# Patient Record
Sex: FEMALE | Race: WHITE | NOT HISPANIC OR LATINO | URBAN - METROPOLITAN AREA
[De-identification: names, ages, dates, MRNs, and addresses within clinical notes are randomized per-mention and may not be internally consistent; named-entity substitution may affect disease eponyms.]

---

## 2018-01-02 ENCOUNTER — HOSPITAL ENCOUNTER (INPATIENT)
Facility: HOSPITAL | Age: 73
LOS: 3 days | Discharge: HOME | DRG: 192 | End: 2018-01-05
Attending: EMERGENCY MEDICINE | Admitting: EMERGENCY MEDICINE
Payer: MEDICARE

## 2018-01-02 DIAGNOSIS — J44.1 COPD WITH ACUTE EXACERBATION: ICD-10-CM

## 2018-01-02 DIAGNOSIS — R06.00 DYSPNEA, UNSPECIFIED TYPE: Primary | ICD-10-CM

## 2018-01-02 DIAGNOSIS — R07.9 CHEST PAIN: ICD-10-CM

## 2018-01-02 DIAGNOSIS — R79.89 ELEVATED TROPONIN: ICD-10-CM

## 2018-01-02 DIAGNOSIS — I35.9 NONRHEUMATIC AORTIC VALVE DISORDER: ICD-10-CM

## 2018-01-02 LAB
ALBUMIN SERPL BCP-MCNC: 3.6 G/DL
ALP SERPL-CCNC: 95 U/L
ALT SERPL W/O P-5'-P-CCNC: 28 U/L
ANION GAP SERPL CALC-SCNC: 15 MMOL/L
AST SERPL-CCNC: 24 U/L
BASOPHILS # BLD AUTO: 0.02 K/UL
BASOPHILS NFR BLD: 0.2 %
BILIRUB SERPL-MCNC: 0.9 MG/DL
BNP SERPL-MCNC: 144 PG/ML
BUN SERPL-MCNC: 19 MG/DL
CALCIUM SERPL-MCNC: 10.2 MG/DL
CHLORIDE SERPL-SCNC: 90 MMOL/L
CO2 SERPL-SCNC: 32 MMOL/L
CREAT SERPL-MCNC: 0.8 MG/DL
DIFFERENTIAL METHOD: ABNORMAL
EOSINOPHIL # BLD AUTO: 0 K/UL
EOSINOPHIL NFR BLD: 0 %
ERYTHROCYTE [DISTWIDTH] IN BLOOD BY AUTOMATED COUNT: 12.8 %
EST. GFR  (AFRICAN AMERICAN): >60 ML/MIN/1.73 M^2
EST. GFR  (NON AFRICAN AMERICAN): >60 ML/MIN/1.73 M^2
GLUCOSE SERPL-MCNC: 151 MG/DL
HCT VFR BLD AUTO: 48.1 %
HGB BLD-MCNC: 17.5 G/DL
INR PPP: 1
LYMPHOCYTES # BLD AUTO: 0.7 K/UL
LYMPHOCYTES NFR BLD: 6.3 %
MCH RBC QN AUTO: 31.8 PG
MCHC RBC AUTO-ENTMCNC: 36.4 G/DL
MCV RBC AUTO: 88 FL
MONOCYTES # BLD AUTO: 1.4 K/UL
MONOCYTES NFR BLD: 12.7 %
NEUTROPHILS # BLD AUTO: 9.1 K/UL
NEUTROPHILS NFR BLD: 80.8 %
PLATELET # BLD AUTO: 221 K/UL
PMV BLD AUTO: 9.9 FL
POTASSIUM SERPL-SCNC: 3.5 MMOL/L
PROT SERPL-MCNC: 8.2 G/DL
PROTHROMBIN TIME: 10.1 SEC
RBC # BLD AUTO: 5.5 M/UL
SODIUM SERPL-SCNC: 137 MMOL/L
TROPONIN I SERPL DL<=0.01 NG/ML-MCNC: 0.07 NG/ML
WBC # BLD AUTO: 11.27 K/UL

## 2018-01-02 PROCEDURE — 94640 AIRWAY INHALATION TREATMENT: CPT

## 2018-01-02 PROCEDURE — 85610 PROTHROMBIN TIME: CPT

## 2018-01-02 PROCEDURE — 93010 ELECTROCARDIOGRAM REPORT: CPT | Mod: ,,, | Performed by: INTERNAL MEDICINE

## 2018-01-02 PROCEDURE — 83880 ASSAY OF NATRIURETIC PEPTIDE: CPT

## 2018-01-02 PROCEDURE — 84484 ASSAY OF TROPONIN QUANT: CPT | Mod: 91

## 2018-01-02 PROCEDURE — 93010 ELECTROCARDIOGRAM REPORT: CPT | Mod: 76,,, | Performed by: INTERNAL MEDICINE

## 2018-01-02 PROCEDURE — 94761 N-INVAS EAR/PLS OXIMETRY MLT: CPT

## 2018-01-02 PROCEDURE — 36415 COLL VENOUS BLD VENIPUNCTURE: CPT

## 2018-01-02 PROCEDURE — 63600175 PHARM REV CODE 636 W HCPCS: Performed by: EMERGENCY MEDICINE

## 2018-01-02 PROCEDURE — 93005 ELECTROCARDIOGRAM TRACING: CPT

## 2018-01-02 PROCEDURE — 84484 ASSAY OF TROPONIN QUANT: CPT

## 2018-01-02 PROCEDURE — 99900035 HC TECH TIME PER 15 MIN (STAT)

## 2018-01-02 PROCEDURE — 99285 EMERGENCY DEPT VISIT HI MDM: CPT | Mod: 25

## 2018-01-02 PROCEDURE — 25000003 PHARM REV CODE 250: Performed by: EMERGENCY MEDICINE

## 2018-01-02 PROCEDURE — 21400001 HC TELEMETRY ROOM

## 2018-01-02 PROCEDURE — 27100107 HC POCKET PEAK FLOW METER

## 2018-01-02 PROCEDURE — 85025 COMPLETE CBC W/AUTO DIFF WBC: CPT

## 2018-01-02 PROCEDURE — 27000221 HC OXYGEN, UP TO 24 HOURS

## 2018-01-02 PROCEDURE — 96375 TX/PRO/DX INJ NEW DRUG ADDON: CPT

## 2018-01-02 PROCEDURE — 25000242 PHARM REV CODE 250 ALT 637 W/ HCPCS: Performed by: EMERGENCY MEDICINE

## 2018-01-02 PROCEDURE — 96365 THER/PROPH/DIAG IV INF INIT: CPT

## 2018-01-02 PROCEDURE — 80053 COMPREHEN METABOLIC PANEL: CPT

## 2018-01-02 PROCEDURE — 87040 BLOOD CULTURE FOR BACTERIA: CPT

## 2018-01-02 RX ORDER — SODIUM CHLORIDE 9 MG/ML
INJECTION, SOLUTION INTRAVENOUS CONTINUOUS
Status: ACTIVE | OUTPATIENT
Start: 2018-01-02 | End: 2018-01-03

## 2018-01-02 RX ORDER — IPRATROPIUM BROMIDE AND ALBUTEROL SULFATE 2.5; .5 MG/3ML; MG/3ML
3 SOLUTION RESPIRATORY (INHALATION) EVERY 4 HOURS
Status: DISPENSED | OUTPATIENT
Start: 2018-01-02 | End: 2018-01-03

## 2018-01-02 RX ORDER — ONDANSETRON 2 MG/ML
4 INJECTION INTRAMUSCULAR; INTRAVENOUS EVERY 8 HOURS PRN
Status: DISCONTINUED | OUTPATIENT
Start: 2018-01-02 | End: 2018-01-03

## 2018-01-02 RX ORDER — IPRATROPIUM BROMIDE 0.5 MG/2.5ML
0.5 SOLUTION RESPIRATORY (INHALATION)
Status: COMPLETED | OUTPATIENT
Start: 2018-01-02 | End: 2018-01-02

## 2018-01-02 RX ORDER — MORPHINE SULFATE 10 MG/ML
2 INJECTION INTRAMUSCULAR; INTRAVENOUS; SUBCUTANEOUS EVERY 4 HOURS PRN
Status: DISCONTINUED | OUTPATIENT
Start: 2018-01-02 | End: 2018-01-03

## 2018-01-02 RX ORDER — SODIUM CHLORIDE 0.9 % (FLUSH) 0.9 %
3 SYRINGE (ML) INJECTION EVERY 8 HOURS PRN
Status: DISCONTINUED | OUTPATIENT
Start: 2018-01-02 | End: 2018-01-03

## 2018-01-02 RX ORDER — METHYLPREDNISOLONE SOD SUCC 125 MG
80 VIAL (EA) INJECTION
Status: COMPLETED | OUTPATIENT
Start: 2018-01-02 | End: 2018-01-02

## 2018-01-02 RX ORDER — METHYLPREDNISOLONE SOD SUCC 125 MG
80 VIAL (EA) INJECTION EVERY 8 HOURS
Status: DISCONTINUED | OUTPATIENT
Start: 2018-01-02 | End: 2018-01-03

## 2018-01-02 RX ORDER — IBUPROFEN 200 MG
200 TABLET ORAL EVERY 6 HOURS PRN
COMMUNITY

## 2018-01-02 RX ORDER — ASPIRIN 325 MG
325 TABLET ORAL DAILY
Status: DISCONTINUED | OUTPATIENT
Start: 2018-01-03 | End: 2018-01-05 | Stop reason: HOSPADM

## 2018-01-02 RX ORDER — LEVALBUTEROL 1.25 MG/.5ML
1.25 SOLUTION, CONCENTRATE RESPIRATORY (INHALATION)
Status: COMPLETED | OUTPATIENT
Start: 2018-01-02 | End: 2018-01-02

## 2018-01-02 RX ORDER — NAPROXEN SODIUM 220 MG/1
324 TABLET, FILM COATED ORAL
Status: COMPLETED | OUTPATIENT
Start: 2018-01-02 | End: 2018-01-02

## 2018-01-02 RX ADMIN — METHYLPREDNISOLONE SODIUM SUCCINATE 80 MG: 125 INJECTION, POWDER, FOR SOLUTION INTRAMUSCULAR; INTRAVENOUS at 04:01

## 2018-01-02 RX ADMIN — AZITHROMYCIN MONOHYDRATE 500 MG: 500 INJECTION, POWDER, LYOPHILIZED, FOR SOLUTION INTRAVENOUS at 08:01

## 2018-01-02 RX ADMIN — METHYLPREDNISOLONE SODIUM SUCCINATE 80 MG: 125 INJECTION, POWDER, FOR SOLUTION INTRAMUSCULAR; INTRAVENOUS at 09:01

## 2018-01-02 RX ADMIN — ASPIRIN 81 MG 324 MG: 81 TABLET ORAL at 04:01

## 2018-01-02 RX ADMIN — LEVALBUTEROL 1.25 MG: 1.25 SOLUTION, CONCENTRATE RESPIRATORY (INHALATION) at 04:01

## 2018-01-02 RX ADMIN — SODIUM CHLORIDE: 0.9 INJECTION, SOLUTION INTRAVENOUS at 08:01

## 2018-01-02 RX ADMIN — IPRATROPIUM BROMIDE 0.5 MG: 0.5 SOLUTION RESPIRATORY (INHALATION) at 04:01

## 2018-01-02 RX ADMIN — CEFTRIAXONE 2 G: 2 INJECTION, SOLUTION INTRAVENOUS at 06:01

## 2018-01-02 NOTE — ED PROVIDER NOTES
"Encounter Date: 1/2/2018    SCRIBE #1 NOTE: I, Shondaruaga Jenn, am scribing for, and in the presence of,  Toby Griffin MD. I have scribed the following portions of the note - Other sections scribed: HPI and ROS.       History     Chief Complaint   Patient presents with    Cough     cough x 6 days; sob x 4 days; sent from urgent care for "low oxygen level and acute bronchitis"    Shortness of Breath     Chief Complaint: Cough; SOB    HPI: This 72 y.o. Female with IBS presents to the ED c/o secondary to a cough and SOB. Cough is productive with green/yellow phlegm. There's associated chills and an appetite decrease. Patient states "I just wasn't feeling right". Symptoms are severe and have progressively worsened since onset. There's no attempted treatment. She denies fever, headaches, chest pain, weakness, nausea, vomiting, or abdominal pain.      The history is provided by the patient. No  was used.     Review of patient's allergies indicates:  No Known Allergies  Past Medical History:   Diagnosis Date    IBS (irritable bowel syndrome)      History reviewed. No pertinent surgical history.  History reviewed. No pertinent family history.  Social History   Substance Use Topics    Smoking status: Former Smoker     Types: Cigarettes     Quit date: 12/28/2017    Smokeless tobacco: Not on file      Comment: Pt states "i quit smoking 5 days ago"    Alcohol use Not on file     Review of Systems   Constitutional: Positive for appetite change and chills. Negative for fever.   HENT: Negative for ear pain and sore throat.    Eyes: Negative for pain.   Respiratory: Positive for cough and shortness of breath.    Cardiovascular: Negative for chest pain.   Gastrointestinal: Negative for abdominal pain, diarrhea, nausea and vomiting.   Genitourinary: Negative for dysuria and hematuria.   Musculoskeletal: Negative for myalgias (arm or leg pain).   Skin: Negative for rash.   Neurological: Negative for " weakness and headaches.       Physical Exam     Initial Vitals [01/02/18 1210]   BP Pulse Resp Temp SpO2   (!) 149/78 (!) 115 20 98 °F (36.7 °C) (!) 88 %      MAP       101.67         Physical Exam  Nursing note and vitals reviewed.  Constitutional:  Uncomfortable appearing elderly female with increased work of breathing.  HENT:    Head: NC/AT    Eyes: Conjunctivae normal.   (-) scleral icterus.              Mouth/Throat: Dry mucosal membranes..     Neck: Neck supple, normal rom.    Cardiovascular: Tachycardia, regular rhythm  Pulmonary/Chest: Diminish breath sounds with bibasilar crackles.  Abdominal: Soft. ND/NT.  (-) CVA tenderness.  Musculoskeletal: FROM of all major joints. No extremity edema or tenderness.  Neurological: A&Ox4,  Normal speech.  No acute focal motor deficits.   Skin: Skin is warm and dry.  Psychiatric: normal mood and affect.      ED Course   Critical Care  Date/Time: 1/2/2018 8:15 PM  Performed by: ADILSON VILLAGRAN.  Authorized by: ADILSON VILLAGRAN   Direct patient critical care time: 20 minutes  Additional history critical care time: 10 minutes  Ordering / reviewing critical care time: 10 minutes  Documentation critical care time: 10 minutes  Consulting other physicians critical care time: 5 minutes  Total critical care time (exclusive of procedural time) : 55 minutes  Critical care time was exclusive of separately billable procedures and treating other patients and teaching time.  Critical care was necessary to treat or prevent imminent or life-threatening deterioration of the following conditions: respiratory failure.  Critical care was time spent personally by me on the following activities: development of treatment plan with patient or surrogate, evaluation of patient's response to treatment, examination of patient, obtaining history from patient or surrogate, ordering and performing treatments and interventions, ordering and review of laboratory studies, ordering and review of  radiographic studies, pulse oximetry, re-evaluation of patient's condition and review of old charts.        Labs Reviewed   CBC W/ AUTO DIFFERENTIAL - Abnormal; Notable for the following:        Result Value    RBC 5.50 (*)     Hemoglobin 17.5 (*)     MCH 31.8 (*)     MCHC 36.4 (*)     Gran # 9.1 (*)     Lymph # 0.7 (*)     Mono # 1.4 (*)     Gran% 80.8 (*)     Lymph% 6.3 (*)     All other components within normal limits   COMPREHENSIVE METABOLIC PANEL - Abnormal; Notable for the following:     Chloride 90 (*)     CO2 32 (*)     Glucose 151 (*)     All other components within normal limits   B-TYPE NATRIURETIC PEPTIDE - Abnormal; Notable for the following:      (*)     All other components within normal limits   TROPONIN I - Abnormal; Notable for the following:     Troponin I 0.066 (*)     All other components within normal limits   PROTIME-INR     EKG Readings: (Independently Interpreted)   Initial Reading: No STEMI.   Sinus tachycardia with occasional PVC, left axis deviation, normal intervals, no ST/T-wave abnormalities.       X-Rays:   Independently Interpreted Readings:   Chest X-Ray: No infiltrates. Chronic lung findings including probable emphysema.  No acute process.     Medical Decision Making:   History:   Old Medical Records: I decided to obtain old medical records.  Old Records Summarized: records from clinic visits and other records.  Independently Interpreted Test(s):   I have ordered and independently interpreted X-rays - see prior notes.  I have ordered and independently interpreted EKG Reading(s) - see prior notes  Clinical Tests:   Lab Tests: Ordered and Reviewed  Radiological Study: Ordered and Reviewed  Medical Tests: Ordered and Reviewed    Differential diagnosis:   Initial differential diagnosis includes but is not limited to...URI, bronchitis, postobstructive pneumonia, pneumothorax, asthma exacerbation, acute reactive airway disease, PE.       Additional Medical Decision Making:  "  Emergent evaluation a 72-year-old female, Former smoker, presents to the ED complaining of nonproductive cough with associated shortness of breath for the past 6 days.  She was initially seen at an urgent care center and quickly referred to the emergency department after She was noted to have a "low oxygen level."  Initial vital ssignificant for Tachycardia, tachypnea w/ an SPO2 of 88% RA.Chest x-ray consistent with emphysematous changes.    IV Solu-Medrol given along with Xopenex/Atrovent nebs.  EKG and Labs pending...    - Basic labs unremarkable.  No evidence of acute ischemia on EKG.  Despite her unremarkable EKG, her troponin was found to be elevated (0.052).  Upon further questioning, patient indecisiveness any remote or recent chest pain, nausea/vomiting and/or diaphoresis.  Aspirin given.  She'll be admitted to medicine for further evaluation and management including continuous cardiac monitoring,  Scheduled albuterol/Atrovent nebs along with IV antibiotics for presumed pneumonia vs Exacerbation of undiagnosed COPD.  We'll obtain serial cardiac enzymes in an effort to help rule out ACS.                  Scribe Attestation:   Scribe #1: I performed the above scribed service and the documentation accurately describes the services I performed. I attest to the accuracy of the note.    Attending Attestation:           Physician Attestation for Scribe:  Physician Attestation Statement for Scribe #1: I, Toby Griffin MD, reviewed documentation, as scribed by Margie Barajas in my presence, and it is both accurate and complete.                 ED Course      Clinical Impression:   The primary encounter diagnosis was Dyspnea, unspecified type. Diagnoses of Chest pain, COPD with acute exacerbation, Elevated troponin, and Nonrheumatic aortic valve disorder were also pertinent to this visit.    Disposition:   Disposition: Admitted  Condition: Fair                        Toby Griffin MD  01/05/18 2333    "

## 2018-01-02 NOTE — ED TRIAGE NOTES
"Arrived via personal transportation. SOB that started about 4 days ago. Pt states "It progressively got worst over the past 4 days". Productive cough. Pt reports yellow/green sputum. Denies chest pain. Diarrhea but pr reports PMH of IBS. Aaox4.   "

## 2018-01-03 PROBLEM — Z72.0 TOBACCO ABUSE: Chronic | Status: ACTIVE | Noted: 2018-01-03

## 2018-01-03 PROBLEM — R79.89 ELEVATED TROPONIN: Status: ACTIVE | Noted: 2018-01-03

## 2018-01-03 PROBLEM — R06.00 DYSPNEA: Status: RESOLVED | Noted: 2018-01-02 | Resolved: 2018-01-03

## 2018-01-03 LAB
ALBUMIN SERPL BCP-MCNC: 3.1 G/DL
ALP SERPL-CCNC: 84 U/L
ALT SERPL W/O P-5'-P-CCNC: 27 U/L
ANION GAP SERPL CALC-SCNC: 11 MMOL/L
AORTIC VALVE STENOSIS: ABNORMAL
AST SERPL-CCNC: 23 U/L
BASOPHILS # BLD AUTO: 0.01 K/UL
BASOPHILS NFR BLD: 0.1 %
BILIRUB SERPL-MCNC: 0.7 MG/DL
BUN SERPL-MCNC: 21 MG/DL
CALCIUM SERPL-MCNC: 9.7 MG/DL
CHLORIDE SERPL-SCNC: 91 MMOL/L
CO2 SERPL-SCNC: 33 MMOL/L
CREAT SERPL-MCNC: 0.7 MG/DL
DIASTOLIC DYSFUNCTION: NO
DIFFERENTIAL METHOD: ABNORMAL
EOSINOPHIL # BLD AUTO: 0 K/UL
EOSINOPHIL NFR BLD: 0 %
ERYTHROCYTE [DISTWIDTH] IN BLOOD BY AUTOMATED COUNT: 12.8 %
EST. GFR  (AFRICAN AMERICAN): >60 ML/MIN/1.73 M^2
EST. GFR  (NON AFRICAN AMERICAN): >60 ML/MIN/1.73 M^2
ESTIMATED AVG GLUCOSE: 97 MG/DL
ESTIMATED PA SYSTOLIC PRESSURE: 32.16
GLUCOSE SERPL-MCNC: 131 MG/DL
HBA1C MFR BLD HPLC: 5 %
HCT VFR BLD AUTO: 44.2 %
HGB BLD-MCNC: 15.7 G/DL
LYMPHOCYTES # BLD AUTO: 0.7 K/UL
LYMPHOCYTES NFR BLD: 8.3 %
MAGNESIUM SERPL-MCNC: 2 MG/DL
MCH RBC QN AUTO: 31.5 PG
MCHC RBC AUTO-ENTMCNC: 35.5 G/DL
MCV RBC AUTO: 89 FL
MONOCYTES # BLD AUTO: 0.3 K/UL
MONOCYTES NFR BLD: 3.8 %
NEUTROPHILS # BLD AUTO: 7.2 K/UL
NEUTROPHILS NFR BLD: 87.8 %
PHOSPHATE SERPL-MCNC: 2.5 MG/DL
PLATELET # BLD AUTO: 221 K/UL
PMV BLD AUTO: 10.2 FL
POTASSIUM SERPL-SCNC: 3.5 MMOL/L
PROT SERPL-MCNC: 7.3 G/DL
RBC # BLD AUTO: 4.98 M/UL
RETIRED EF AND QEF - SEE NOTES: 55 (ref 55–65)
SODIUM SERPL-SCNC: 135 MMOL/L
TROPONIN I SERPL DL<=0.01 NG/ML-MCNC: 0.04 NG/ML
TROPONIN I SERPL DL<=0.01 NG/ML-MCNC: 0.05 NG/ML
WBC # BLD AUTO: 8.2 K/UL

## 2018-01-03 PROCEDURE — 84100 ASSAY OF PHOSPHORUS: CPT

## 2018-01-03 PROCEDURE — 27000221 HC OXYGEN, UP TO 24 HOURS

## 2018-01-03 PROCEDURE — 63600175 PHARM REV CODE 636 W HCPCS: Performed by: INTERNAL MEDICINE

## 2018-01-03 PROCEDURE — 94761 N-INVAS EAR/PLS OXIMETRY MLT: CPT

## 2018-01-03 PROCEDURE — 99223 1ST HOSP IP/OBS HIGH 75: CPT | Mod: ,,, | Performed by: INTERNAL MEDICINE

## 2018-01-03 PROCEDURE — 25000003 PHARM REV CODE 250: Performed by: EMERGENCY MEDICINE

## 2018-01-03 PROCEDURE — 83036 HEMOGLOBIN GLYCOSYLATED A1C: CPT

## 2018-01-03 PROCEDURE — 21400001 HC TELEMETRY ROOM

## 2018-01-03 PROCEDURE — 84484 ASSAY OF TROPONIN QUANT: CPT

## 2018-01-03 PROCEDURE — 25000242 PHARM REV CODE 250 ALT 637 W/ HCPCS: Performed by: EMERGENCY MEDICINE

## 2018-01-03 PROCEDURE — 83735 ASSAY OF MAGNESIUM: CPT

## 2018-01-03 PROCEDURE — 80053 COMPREHEN METABOLIC PANEL: CPT

## 2018-01-03 PROCEDURE — 25000003 PHARM REV CODE 250: Performed by: INTERNAL MEDICINE

## 2018-01-03 PROCEDURE — 99900035 HC TECH TIME PER 15 MIN (STAT)

## 2018-01-03 PROCEDURE — 94640 AIRWAY INHALATION TREATMENT: CPT

## 2018-01-03 PROCEDURE — 93306 TTE W/DOPPLER COMPLETE: CPT | Mod: 26,,, | Performed by: INTERNAL MEDICINE

## 2018-01-03 PROCEDURE — 85025 COMPLETE CBC W/AUTO DIFF WBC: CPT

## 2018-01-03 PROCEDURE — 93306 TTE W/DOPPLER COMPLETE: CPT

## 2018-01-03 PROCEDURE — 36415 COLL VENOUS BLD VENIPUNCTURE: CPT

## 2018-01-03 RX ORDER — ENOXAPARIN SODIUM 100 MG/ML
40 INJECTION SUBCUTANEOUS EVERY 24 HOURS
Status: DISCONTINUED | OUTPATIENT
Start: 2018-01-03 | End: 2018-01-05 | Stop reason: HOSPADM

## 2018-01-03 RX ORDER — PANTOPRAZOLE SODIUM 40 MG/1
40 TABLET, DELAYED RELEASE ORAL DAILY
Status: DISCONTINUED | OUTPATIENT
Start: 2018-01-03 | End: 2018-01-05 | Stop reason: HOSPADM

## 2018-01-03 RX ORDER — RAMELTEON 8 MG/1
8 TABLET ORAL NIGHTLY PRN
Status: DISCONTINUED | OUTPATIENT
Start: 2018-01-03 | End: 2018-01-05 | Stop reason: HOSPADM

## 2018-01-03 RX ORDER — MOXIFLOXACIN HYDROCHLORIDE 400 MG/1
400 TABLET ORAL DAILY
Status: DISCONTINUED | OUTPATIENT
Start: 2018-01-03 | End: 2018-01-05 | Stop reason: HOSPADM

## 2018-01-03 RX ORDER — ACETAMINOPHEN 500 MG
500 TABLET ORAL EVERY 6 HOURS PRN
Status: DISCONTINUED | OUTPATIENT
Start: 2018-01-03 | End: 2018-01-05 | Stop reason: HOSPADM

## 2018-01-03 RX ORDER — METHYLPREDNISOLONE SOD SUCC 125 MG
125 VIAL (EA) INJECTION EVERY 8 HOURS
Status: DISCONTINUED | OUTPATIENT
Start: 2018-01-03 | End: 2018-01-05 | Stop reason: HOSPADM

## 2018-01-03 RX ORDER — IBUPROFEN 200 MG
1 TABLET ORAL DAILY
Status: DISCONTINUED | OUTPATIENT
Start: 2018-01-03 | End: 2018-01-05 | Stop reason: HOSPADM

## 2018-01-03 RX ORDER — ONDANSETRON 2 MG/ML
8 INJECTION INTRAMUSCULAR; INTRAVENOUS EVERY 8 HOURS PRN
Status: DISCONTINUED | OUTPATIENT
Start: 2018-01-03 | End: 2018-01-05 | Stop reason: HOSPADM

## 2018-01-03 RX ADMIN — METHYLPREDNISOLONE SODIUM SUCCINATE 125 MG: 125 INJECTION, POWDER, FOR SOLUTION INTRAMUSCULAR; INTRAVENOUS at 06:01

## 2018-01-03 RX ADMIN — ENOXAPARIN SODIUM 40 MG: 100 INJECTION SUBCUTANEOUS at 04:01

## 2018-01-03 RX ADMIN — PANTOPRAZOLE SODIUM 40 MG: 40 TABLET, DELAYED RELEASE ORAL at 09:01

## 2018-01-03 RX ADMIN — IPRATROPIUM BROMIDE AND ALBUTEROL SULFATE 3 ML: .5; 3 SOLUTION RESPIRATORY (INHALATION) at 04:01

## 2018-01-03 RX ADMIN — IPRATROPIUM BROMIDE AND ALBUTEROL SULFATE 3 ML: .5; 3 SOLUTION RESPIRATORY (INHALATION) at 08:01

## 2018-01-03 RX ADMIN — MOXIFLOXACIN HYDROCHLORIDE 400 MG: 400 TABLET, FILM COATED ORAL at 09:01

## 2018-01-03 RX ADMIN — IPRATROPIUM BROMIDE AND ALBUTEROL SULFATE 3 ML: .5; 3 SOLUTION RESPIRATORY (INHALATION) at 12:01

## 2018-01-03 RX ADMIN — ASPIRIN 325 MG ORAL TABLET 325 MG: 325 PILL ORAL at 09:01

## 2018-01-03 RX ADMIN — METHYLPREDNISOLONE SODIUM SUCCINATE 125 MG: 125 INJECTION, POWDER, FOR SOLUTION INTRAMUSCULAR; INTRAVENOUS at 02:01

## 2018-01-03 RX ADMIN — IPRATROPIUM BROMIDE AND ALBUTEROL SULFATE 3 ML: .5; 3 SOLUTION RESPIRATORY (INHALATION) at 05:01

## 2018-01-03 RX ADMIN — METHYLPREDNISOLONE SODIUM SUCCINATE 125 MG: 125 INJECTION, POWDER, FOR SOLUTION INTRAMUSCULAR; INTRAVENOUS at 09:01

## 2018-01-03 NOTE — SUBJECTIVE & OBJECTIVE
"Past Medical History:   Diagnosis Date    IBS (irritable bowel syndrome)        History reviewed. No pertinent surgical history.    Review of patient's allergies indicates:  No Known Allergies    No current facility-administered medications on file prior to encounter.      No current outpatient prescriptions on file prior to encounter.     Family History     None        Social History Main Topics    Smoking status: Former Smoker     Types: Cigarettes     Quit date: 12/28/2017    Smokeless tobacco: Not on file      Comment: Pt states "i quit smoking 5 days ago"    Alcohol use Not on file    Drug use: Unknown    Sexual activity: Not on file     Review of Systems   Constitutional: Positive for fatigue. Negative for activity change, appetite change, chills, diaphoresis, fever and unexpected weight change.   HENT: Negative.    Eyes: Negative.    Respiratory: Positive for cough, shortness of breath and wheezing. Negative for chest tightness.    Cardiovascular: Negative for chest pain, palpitations and leg swelling.   Gastrointestinal: Negative for abdominal distention, abdominal pain, blood in stool, constipation, diarrhea, nausea and vomiting.   Genitourinary: Negative for dysuria and hematuria.   Musculoskeletal: Negative.    Skin: Negative.    Neurological: Positive for weakness. Negative for dizziness, seizures, syncope and light-headedness.   Psychiatric/Behavioral: Negative.      Objective:     Vital Signs (Most Recent):  Temp: 98.5 °F (36.9 °C) (01/03/18 0017)  Pulse: 82 (01/03/18 0027)  Resp: 18 (01/03/18 0027)  BP: 131/60 (01/03/18 0017)  SpO2: 96 % (01/03/18 0027) Vital Signs (24h Range):  Temp:  [98 °F (36.7 °C)-98.7 °F (37.1 °C)] 98.5 °F (36.9 °C)  Pulse:  [] 82  Resp:  [18-20] 18  SpO2:  [88 %-99 %] 96 %  BP: (116-155)/(60-91) 131/60     Weight: 53.1 kg (117 lb 1 oz)  Body mass index is 22.12 kg/m².    Physical Exam   Constitutional: She is oriented to person, place, and time. She appears " well-developed and well-nourished. No distress.   HENT:   Head: Normocephalic and atraumatic.   Right Ear: External ear normal.   Left Ear: External ear normal.   Nose: Nose normal.   Eyes: Right eye exhibits no discharge. Left eye exhibits no discharge.   Neck: Normal range of motion.   Cardiovascular: Normal rate, regular rhythm, normal heart sounds and intact distal pulses.  Exam reveals no gallop and no friction rub.    No murmur heard.  Pulmonary/Chest:   Coughing, minimally increased work of breathing, end-expiratory wheezing bilaterally and diffusely   Abdominal: Soft. Bowel sounds are normal. She exhibits no distension. There is no tenderness. There is no rebound and no guarding.   Musculoskeletal: Normal range of motion. She exhibits no edema.   Neurological: She is alert and oriented to person, place, and time.   Skin: Skin is warm and dry. She is not diaphoretic. No erythema.   Psychiatric: She has a normal mood and affect. Her behavior is normal. Judgment and thought content normal.   Nursing note and vitals reviewed.          Significant Labs: All pertinent labs within the past 24 hours have been reviewed.    Significant Imaging: I have reviewed and interpreted all pertinent imaging results/findings within the past 24 hours.

## 2018-01-03 NOTE — HPI
HPI: Mrs. Zayda Owens is a 72 y.o. female with tobacco abuse who presents to [facility] ED with complaints of dyspnea for one week.  The dyspnea is mainly on exertion and is associated with coughing.  The cough was productive initially of yellow sputum but that has changed to green; there is no blood.  She denies any chest pain, palpitations, diaphoresis, nausea, vomiting, fevers, chills, hemoptysis, nor any lower extremity pain or swelling.  She has not had any recent travel but did spend [place] at her daughter's house and although they weren't ill at the time, they had some respiratory illness before.  She did not have her influenza vaccination this year.  She doesn't have a diagnosed history of COPD but does say that she stopped smoking one week ago when she started to get more short of breath.    Denies CP or Prior CAD  EKG sinus tachycardia - no acute STT changes

## 2018-01-03 NOTE — ASSESSMENT & PLAN NOTE
Denies CP. EKG without acute changes. Suspect demand ischemia. Echo today. Stress test in AM if wheezing improved

## 2018-01-03 NOTE — H&P
"[facility] Medicine  History & Physical    Patient Name: Zayda Owens  MRN: [id number]  Admission Date: 1/2/2018  Attending Physician: Ruben Quan MD   Primary Care Provider: Primary Doctor No         Patient information was obtained from patient.     Subjective:     Principal Problem:COPD with acute exacerbation    Chief Complaint: Shortness of breath for one week.    HPI: Mrs. Zayda Owens is a 72 y.o. female with tobacco abuse who presents to [facility] ED with complaints of dyspnea for one week.  The dyspnea is mainly on exertion and is associated with coughing.  The cough was productive initially of yellow sputum but that has changed to green; there is no blood.  She denies any chest pain, palpitations, diaphoresis, nausea, vomiting, fevers, chills, hemoptysis, nor any lower extremity pain or swelling.  She has not had any recent travel but did spend [place] at her daughter's house and although they weren't ill at the time, they had some respiratory illness before.  She did not have her influenza vaccination this year.  She doesn't have a diagnosed history of COPD but does say that she stopped smoking one week ago when she started to get more short of breath.    Chart Review:  Patient has not had any recent hospitalizations or outpatient clinic visits within the system.    Past Medical History:   Diagnosis Date    IBS (irritable bowel syndrome)        History reviewed. No pertinent surgical history.    Review of patient's allergies indicates:  No Known Allergies    No current facility-administered medications on file prior to encounter.      No current outpatient prescriptions on file prior to encounter.     Family History     None        Social History Main Topics    Smoking status: Former Smoker     Types: Cigarettes     Quit date: 12/28/2017    Smokeless tobacco: Not on file      Comment: Pt states "i quit smoking 5 days ago"    Alcohol use Not on file    Drug " use: Unknown    Sexual activity: Not on file     Review of Systems   Constitutional: Positive for fatigue. Negative for activity change, appetite change, chills, diaphoresis, fever and unexpected weight change.   HENT: Negative.    Eyes: Negative.    Respiratory: Positive for cough, shortness of breath and wheezing. Negative for chest tightness.    Cardiovascular: Negative for chest pain, palpitations and leg swelling.   Gastrointestinal: Negative for abdominal distention, abdominal pain, blood in stool, constipation, diarrhea, nausea and vomiting.   Genitourinary: Negative for dysuria and hematuria.   Musculoskeletal: Negative.    Skin: Negative.    Neurological: Positive for weakness. Negative for dizziness, seizures, syncope and light-headedness.   Psychiatric/Behavioral: Negative.      Objective:     Vital Signs (Most Recent):  Temp: 98.5 °F (36.9 °C) (01/03/18 0017)  Pulse: 82 (01/03/18 0027)  Resp: 18 (01/03/18 0027)  BP: 131/60 (01/03/18 0017)  SpO2: 96 % (01/03/18 0027) Vital Signs (24h Range):  Temp:  [98 °F (36.7 °C)-98.7 °F (37.1 °C)] 98.5 °F (36.9 °C)  Pulse:  [] 82  Resp:  [18-20] 18  SpO2:  [88 %-99 %] 96 %  BP: (116-155)/(60-91) 131/60     Weight: 53.1 kg (117 lb 1 oz)  Body mass index is 22.12 kg/m².    Physical Exam   Constitutional: She is oriented to person, place, and time. She appears well-developed and well-nourished. No distress.   HENT:   Head: Normocephalic and atraumatic.   Right Ear: External ear normal.   Left Ear: External ear normal.   Nose: Nose normal.   Eyes: Right eye exhibits no discharge. Left eye exhibits no discharge.   Neck: Normal range of motion.   Cardiovascular: Normal rate, regular rhythm, normal heart sounds and intact distal pulses.  Exam reveals no gallop and no friction rub.    No murmur heard.  Pulmonary/Chest:   Coughing, minimally increased work of breathing, end-expiratory wheezing bilaterally and diffusely   Abdominal: Soft. Bowel sounds are normal. She  exhibits no distension. There is no tenderness. There is no rebound and no guarding.   Musculoskeletal: Normal range of motion. She exhibits no edema.   Neurological: She is alert and oriented to person, place, and time.   Skin: Skin is warm and dry. She is not diaphoretic. No erythema.   Psychiatric: She has a normal mood and affect. Her behavior is normal. Judgment and thought content normal.   Nursing note and vitals reviewed.          Significant Labs: All pertinent labs within the past 24 hours have been reviewed.    Significant Imaging: I have reviewed and interpreted all pertinent imaging results/findings within the past 24 hours.    Assessment/Plan:     * COPD with acute exacerbation    Although she does not have a history of COPD/emphysema I suspect she does have this given her physical exam and history of tobacco abuse.  She does have some wheezing but does feel better at this time.  I have reviewed the chest X-ray and it reveals no focal infiltrates or pleural effusions.  She was mildly hypoxic on presentation with an ambient air oxygen saturation of 88%, but that has improved with supplemental oxygen therapy.  Will start frequent nebulized LEE/LAMA; intravenous corticosteroids; and empiric antibiotics.        Tobacco abuse    Patient was counseled on smoking cessation and she will be provided a nicotine transdermal patch applied while inpatient.  Will provide additional smoking cessation counseling prior to discharge.          VTE Risk Mitigation         Ordered     enoxaparin injection 40 mg  Daily     Route:  Subcutaneous        01/03/18 0212     Medium Risk of VTE  Once      01/03/18 0212             Total time spent on case: 45 minutes.        Khoi Christianson M.D.  Staff Nocturnist  Department of Hospital Medicine  [facility]  Pager: [phone]

## 2018-01-03 NOTE — CARE UPDATE
Reviewed H and P by my colleague and agree with A and P.  Patient presenting likely with COPD exacerbation.  No history of COPD- but C02 retention on BMP suggest this especially with smoking history.  Currently stable. Labs reviewed. Cards consulted for elevated troponin- likely demand ischemia.

## 2018-01-03 NOTE — SUBJECTIVE & OBJECTIVE
"Past Medical History:   Diagnosis Date    IBS (irritable bowel syndrome)        History reviewed. No pertinent surgical history.    Review of patient's allergies indicates:  No Known Allergies    No current facility-administered medications on file prior to encounter.      No current outpatient prescriptions on file prior to encounter.     Family History     None        Social History Main Topics    Smoking status: Former Smoker     Types: Cigarettes     Quit date: 12/28/2017    Smokeless tobacco: Not on file      Comment: Pt states "i quit smoking 5 days ago"    Alcohol use Not on file    Drug use: Unknown    Sexual activity: Not on file     Review of Systems   Constitution: Negative for decreased appetite.   HENT: Negative for ear discharge.    Eyes: Negative for blurred vision.   Respiratory: Negative for hemoptysis.    Endocrine: Negative for polyphagia.   Hematologic/Lymphatic: Negative for adenopathy.   Skin: Negative for color change.   Musculoskeletal: Negative for joint swelling.   Neurological: Negative for brief paralysis.   Psychiatric/Behavioral: Negative for hallucinations.     Objective:     Vital Signs (Most Recent):  Temp: 98 °F (36.7 °C) (01/03/18 0825)  Pulse: 101 (01/03/18 0835)  Resp: 18 (01/03/18 0835)  BP: (!) 116/58 (01/03/18 0825)  SpO2: (!) 93 % (01/03/18 0835) Vital Signs (24h Range):  Temp:  [97.5 °F (36.4 °C)-98.7 °F (37.1 °C)] 98 °F (36.7 °C)  Pulse:  [] 101  Resp:  [18-20] 18  SpO2:  [88 %-100 %] 93 %  BP: (116-166)/(58-91) 116/58     Weight: 53.1 kg (117 lb 1 oz)  Body mass index is 22.12 kg/m².    SpO2: (!) 93 %  O2 Device (Oxygen Therapy): nasal cannula    No intake or output data in the 24 hours ending 01/03/18 1011    Lines/Drains/Airways     Peripheral Intravenous Line                 Peripheral IV - Single Lumen 01/02/18 1853 Left Antecubital less than 1 day                Physical Exam   Constitutional: She is oriented to person, place, and time. She appears " well-developed and well-nourished.   HENT:   Head: Normocephalic and atraumatic.   Eyes: Conjunctivae are normal. Pupils are equal, round, and reactive to light.   Neck: Normal range of motion. Neck supple.   Cardiovascular: Normal rate, normal heart sounds and intact distal pulses.    Pulmonary/Chest: Effort normal. She has wheezes.   Abdominal: Soft. Bowel sounds are normal.   Musculoskeletal: Normal range of motion.   Neurological: She is alert and oriented to person, place, and time.   Skin: Skin is warm and dry.       Significant Labs: All pertinent lab results from the last 24 hours have been reviewed.    Significant Imaging: X-Ray: CXR: X-Ray Chest 1 View (CXR): No results found for this visit on 01/02/18.

## 2018-01-03 NOTE — PLAN OF CARE
01/03/18 1120   Discharge Assessment   Assessment Type Discharge Planning Assessment  (pt off unit for testing, [place] to follow up at later time)

## 2018-01-03 NOTE — HPI
Mrs. Zayda Owens is a 72 y.o. female with tobacco abuse who presents to [facility] ED with complaints of dyspnea for one week.  The dyspnea is mainly on exertion and is associated with coughing.  The cough was productive initially of yellow sputum but that has changed to green; there is no blood.  She denies any chest pain, palpitations, diaphoresis, nausea, vomiting, fevers, chills, hemoptysis, nor any lower extremity pain or swelling.  She has not had any recent travel but did spend [place] at her daughter's house and although they weren't ill at the time, they had some respiratory illness before.  She did not have her influenza vaccination this year.  She doesn't have a diagnosed history of COPD but does say that she stopped smoking one week ago when she started to get more short of breath.

## 2018-01-03 NOTE — PLAN OF CARE
Problem: Patient Care Overview  Goal: Plan of Care Review   18   Coping/Psychosocial   Plan Of Care Reviewed With patient     Goal: Interdisciplinary Rounds/Family Conf   18   Interdisciplinary Rounds/Family Conf   Participants patient;nursing       Problem: Fall Risk (Adult)  Intervention: Reduce Risk/Promote Restraint Free Environment   18   Safety Interventions   Environmental Safety Modification clutter free environment maintained;room near unit station   Prevent [place] Drop/Fall   Safety/Security Measures bed alarm set     Intervention: Review Medications/Identify Contributors to Fall Risk   18   Safety Interventions   Medication Review/Management medications reviewed     Intervention: Patient Rounds   18 135   Safety Interventions   Patient Rounds bed in low position;bed wheels locked;call light in reach;placement of personal items at bedside;ID band on;clutter free environment maintained;visualized patient;toileting offered     Intervention: Safety Promotion/Fall Prevention   18   Safety Interventions   Safety Promotion/Fall Prevention nonskid shoes/socks when out of bed;bed alarm set;side rails raised x 2       Goal: Absence of Falls  Patient will demonstrate the desired outcomes by discharge/transition of care.    18   Fall Risk (Adult)   Absence of Falls making progress toward outcome       Problem: ARDS (Acute Resp Distress Syndrome) (Adult)  Intervention: Optimize Oxygenation/Ventilation/Perfusion   18   Respiratory Interventions   Airway/Ventilation Management airway patency maintained;calming measures promoted     Intervention: Position to Optimize Ventilation   18   Positioning   Body Position positioned/repositioned independently     Intervention: Provide Preventive/Supportive Care   18   Activity   Activity Type activity adjusted per tolerance;activity clustered for rest period      Intervention: Prevent/Manage DVT/VTE Risk   01/03/18 1350   Minimize Embolism Risk   VTE Prevention/Management ROM (active) performed     Intervention: Support/Optimize Psychosocial Response to Illness   01/03/18 1350   Coping/Psychosocial Interventions   Supportive Measures relaxation techniques promoted;positive reinforcement provided;self-care encouraged   Psychosocial Support   Family/Support System Care presence promoted       Goal: Signs and Symptoms of Listed Potential Problems Will be Absent, Minimized or Managed (ARDS)  Signs and symptoms of listed potential problems will be absent, minimized or managed by discharge/transition of care (reference ARDS (Acute Resp Distress Syndrome) (Adult) CPG).    01/03/18 1350   ARDS (Acute Resp Distress Syndrome)   Problems Assessed (Acute Respiratory Distress Syndrome) hypoxia/hypoxemia   Problems Present (Acute Respiratory Distress Syndrome) situational response

## 2018-01-03 NOTE — CONSULTS
"[facility]  Cardiology  Consult Note    Patient Name: Zayda Owens  MRN: [id number]  Admission Date: 1/2/2018  Hospital Length of Stay: 1 days  Code Status: Full Code   Attending Provider: Ruben Quan MD   Consulting Provider: Sylvain Monzon MD  Primary Care Physician: Primary Doctor No  Principal Problem:COPD with acute exacerbation    Patient information was obtained from patient and ER records.     Consults  Subjective:     Chief Complaint:  Elevated troponin     HPI:   HPI: Mrs. Zayda Owens is a 72 y.o. female with tobacco abuse who presents to [facility] ED with complaints of dyspnea for one week.  The dyspnea is mainly on exertion and is associated with coughing.  The cough was productive initially of yellow sputum but that has changed to green; there is no blood.  She denies any chest pain, palpitations, diaphoresis, nausea, vomiting, fevers, chills, hemoptysis, nor any lower extremity pain or swelling.  She has not had any recent travel but did spend [place] at her daughter's house and although they weren't ill at the time, they had some respiratory illness before.  She did not have her influenza vaccination this year.  She doesn't have a diagnosed history of COPD but does say that she stopped smoking one week ago when she started to get more short of breath.    Denies CP or Prior CAD  EKG sinus tachycardia - no acute STT changes    Past Medical History:   Diagnosis Date    IBS (irritable bowel syndrome)        History reviewed. No pertinent surgical history.    Review of patient's allergies indicates:  No Known Allergies    No current facility-administered medications on file prior to encounter.      No current outpatient prescriptions on file prior to encounter.     Family History     None        Social History Main Topics    Smoking status: Former Smoker     Types: Cigarettes     Quit date: 12/28/2017    Smokeless tobacco: Not on file      Comment: Pt states "i quit " "smoking 5 days ago"    Alcohol use Not on file    Drug use: Unknown    Sexual activity: Not on file     Review of Systems   Constitution: Negative for decreased appetite.   HENT: Negative for ear discharge.    Eyes: Negative for blurred vision.   Respiratory: Negative for hemoptysis.    Endocrine: Negative for polyphagia.   Hematologic/Lymphatic: Negative for adenopathy.   Skin: Negative for color change.   Musculoskeletal: Negative for joint swelling.   Neurological: Negative for brief paralysis.   Psychiatric/Behavioral: Negative for hallucinations.     Objective:     Vital Signs (Most Recent):  Temp: 98 °F (36.7 °C) (01/03/18 0825)  Pulse: 101 (01/03/18 0835)  Resp: 18 (01/03/18 0835)  BP: (!) 116/58 (01/03/18 0825)  SpO2: (!) 93 % (01/03/18 0835) Vital Signs (24h Range):  Temp:  [97.5 °F (36.4 °C)-98.7 °F (37.1 °C)] 98 °F (36.7 °C)  Pulse:  [] 101  Resp:  [18-20] 18  SpO2:  [88 %-100 %] 93 %  BP: (116-166)/(58-91) 116/58     Weight: 53.1 kg (117 lb 1 oz)  Body mass index is 22.12 kg/m².    SpO2: (!) 93 %  O2 Device (Oxygen Therapy): nasal cannula    No intake or output data in the 24 hours ending 01/03/18 1011    Lines/Drains/Airways     Peripheral Intravenous Line                 Peripheral IV - Single Lumen 01/02/18 1853 Left Antecubital less than 1 day                Physical Exam   Constitutional: She is oriented to person, place, and time. She appears well-developed and well-nourished.   HENT:   Head: Normocephalic and atraumatic.   Eyes: Conjunctivae are normal. Pupils are equal, round, and reactive to light.   Neck: Normal range of motion. Neck supple.   Cardiovascular: Normal rate, normal heart sounds and intact distal pulses.    Pulmonary/Chest: Effort normal. She has wheezes.   Abdominal: Soft. Bowel sounds are normal.   Musculoskeletal: Normal range of motion.   Neurological: She is alert and oriented to person, place, and time.   Skin: Skin is warm and dry.       Significant Labs: All " pertinent lab results from the last 24 hours have been reviewed.    Significant Imaging: X-Ray: CXR: X-Ray Chest 1 View (CXR): No results found for this visit on 01/02/18.    Assessment and Plan:     * COPD with acute exacerbation    Per primary        Elevated troponin    Denies CP. EKG without acute changes. Suspect demand ischemia. Echo today. Stress test in AM if wheezing improved            VTE Risk Mitigation         Ordered     enoxaparin injection 40 mg  Daily     Route:  Subcutaneous        01/03/18 0212     Medium Risk of VTE  Once      01/03/18 0212          Thank you for your consult. I will follow-up with patient. Please contact us if you have any additional questions.    Sylvain Monzon MD  Cardiology   [facility]

## 2018-01-03 NOTE — NURSING
Pt arrived to room via stretcher. Pt is AAOx4. Pt denies needs.Tele box in place. Safety  Maintained.

## 2018-01-04 PROBLEM — I10 HTN (HYPERTENSION), BENIGN: Status: ACTIVE | Noted: 2018-01-04

## 2018-01-04 PROBLEM — J43.8 OTHER EMPHYSEMA: Status: ACTIVE | Noted: 2018-01-04

## 2018-01-04 LAB — DIASTOLIC DYSFUNCTION: NO

## 2018-01-04 PROCEDURE — 25000003 PHARM REV CODE 250: Performed by: INTERNAL MEDICINE

## 2018-01-04 PROCEDURE — 63600175 PHARM REV CODE 636 W HCPCS: Performed by: INTERNAL MEDICINE

## 2018-01-04 PROCEDURE — 93017 CV STRESS TEST TRACING ONLY: CPT

## 2018-01-04 PROCEDURE — 93016 CV STRESS TEST SUPVJ ONLY: CPT | Mod: ,,, | Performed by: INTERNAL MEDICINE

## 2018-01-04 PROCEDURE — 21400001 HC TELEMETRY ROOM

## 2018-01-04 PROCEDURE — 99232 SBSQ HOSP IP/OBS MODERATE 35: CPT | Mod: 25,,, | Performed by: INTERNAL MEDICINE

## 2018-01-04 PROCEDURE — 63600175 PHARM REV CODE 636 W HCPCS

## 2018-01-04 PROCEDURE — 93018 CV STRESS TEST I&R ONLY: CPT | Mod: ,,, | Performed by: INTERNAL MEDICINE

## 2018-01-04 PROCEDURE — 25000003 PHARM REV CODE 250: Performed by: EMERGENCY MEDICINE

## 2018-01-04 PROCEDURE — 78452 HT MUSCLE IMAGE SPECT MULT: CPT | Mod: 26,,, | Performed by: INTERNAL MEDICINE

## 2018-01-04 RX ORDER — REGADENOSON 0.08 MG/ML
INJECTION, SOLUTION INTRAVENOUS
Status: DISPENSED
Start: 2018-01-04 | End: 2018-01-04

## 2018-01-04 RX ORDER — HYDROCHLOROTHIAZIDE 25 MG/1
25 TABLET ORAL DAILY
Status: DISCONTINUED | OUTPATIENT
Start: 2018-01-04 | End: 2018-01-05 | Stop reason: HOSPADM

## 2018-01-04 RX ORDER — ALBUTEROL SULFATE 2.5 MG/.5ML
2.5 SOLUTION RESPIRATORY (INHALATION) EVERY 4 HOURS PRN
Status: DISCONTINUED | OUTPATIENT
Start: 2018-01-04 | End: 2018-01-05 | Stop reason: HOSPADM

## 2018-01-04 RX ADMIN — METHYLPREDNISOLONE SODIUM SUCCINATE 125 MG: 125 INJECTION, POWDER, FOR SOLUTION INTRAMUSCULAR; INTRAVENOUS at 03:01

## 2018-01-04 RX ADMIN — ASPIRIN 325 MG ORAL TABLET 325 MG: 325 PILL ORAL at 11:01

## 2018-01-04 RX ADMIN — PANTOPRAZOLE SODIUM 40 MG: 40 TABLET, DELAYED RELEASE ORAL at 11:01

## 2018-01-04 RX ADMIN — METHYLPREDNISOLONE SODIUM SUCCINATE 125 MG: 125 INJECTION, POWDER, FOR SOLUTION INTRAMUSCULAR; INTRAVENOUS at 09:01

## 2018-01-04 RX ADMIN — MOXIFLOXACIN HYDROCHLORIDE 400 MG: 400 TABLET, FILM COATED ORAL at 11:01

## 2018-01-04 RX ADMIN — METHYLPREDNISOLONE SODIUM SUCCINATE 125 MG: 125 INJECTION, POWDER, FOR SOLUTION INTRAMUSCULAR; INTRAVENOUS at 05:01

## 2018-01-04 RX ADMIN — ENOXAPARIN SODIUM 40 MG: 100 INJECTION SUBCUTANEOUS at 05:01

## 2018-01-04 RX ADMIN — HYDROCHLOROTHIAZIDE 25 MG: 25 TABLET ORAL at 03:01

## 2018-01-04 NOTE — SUBJECTIVE & OBJECTIVE
Interval History:     Review of Systems   Constitution: Negative for decreased appetite.   HENT: Negative for ear discharge.    Eyes: Negative for blurred vision.   Respiratory: Negative for hemoptysis.    Endocrine: Negative for polyphagia.   Hematologic/Lymphatic: Negative for adenopathy.   Skin: Negative for color change.   Musculoskeletal: Negative for joint swelling.   Neurological: Negative for brief paralysis.   Psychiatric/Behavioral: Negative for hallucinations.     Objective:     Vital Signs (Most Recent):  Temp: 97.4 °F (36.3 °C) (01/04/18 0745)  Pulse: 75 (01/04/18 0745)  Resp: 20 (01/04/18 0745)  BP: (!) 176/67 (01/04/18 0745)  SpO2: 96 % (01/04/18 0745) Vital Signs (24h Range):  Temp:  [97.4 °F (36.3 °C)-98.9 °F (37.2 °C)] 97.4 °F (36.3 °C)  Pulse:  [68-96] 75  Resp:  [18-20] 20  SpO2:  [95 %-99 %] 96 %  BP: (130-176)/(61-89) 176/67     Weight: 53.1 kg (117 lb 1 oz)  Body mass index is 22.12 kg/m².     SpO2: 96 %  O2 Device (Oxygen Therapy): nasal cannula      Intake/Output Summary (Last 24 hours) at 01/04/18 0855  Last data filed at 01/03/18 1700   Gross per 24 hour   Intake              650 ml   Output                0 ml   Net              650 ml       Lines/Drains/Airways     Peripheral Intravenous Line                 Peripheral IV - Single Lumen 01/02/18 1853 Left Antecubital 1 day                Physical Exam   Constitutional: She is oriented to person, place, and time. She appears well-developed and well-nourished.   HENT:   Head: Normocephalic and atraumatic.   Eyes: Conjunctivae are normal. Pupils are equal, round, and reactive to light.   Neck: Normal range of motion. Neck supple.   Cardiovascular: Normal rate, normal heart sounds and intact distal pulses.    Pulmonary/Chest: Effort normal. She has wheezes.   Abdominal: Soft. Bowel sounds are normal.   Musculoskeletal: Normal range of motion.   Neurological: She is alert and oriented to person, place, and time.   Skin: Skin is warm and dry.        Significant Labs: All pertinent lab results from the last 24 hours have been reviewed.    Significant Imaging: Echocardiogram:   2D echo with color flow doppler:   Results for orders placed or performed during the hospital encounter of 01/02/18   2D echo with color flow doppler   Result Value Ref Range    EF 55 55 - 65    Diastolic Dysfunction No     Aortic Valve Stenosis MILD (A)     Est. PA Systolic Pressure 32.16

## 2018-01-04 NOTE — PROGRESS NOTES
[facility] Hospital Medicine  Progress Note    Patient Name: Zayda Owens  MRN: [id number]  Patient Class: IP- Inpatient   Admission Date: 1/2/2018  Length of Stay: 2 days  Attending Physician: Ruben Quan MD  Primary Care Provider: Primary Doctor No        Subjective:     Principal Problem:COPD with acute exacerbation    HPI:  Mrs. Zayda Owens is a 72 y.o. female with tobacco abuse who presents to [facility] ED with complaints of dyspnea for one week.  The dyspnea is mainly on exertion and is associated with coughing.  The cough was productive initially of yellow sputum but that has changed to green; there is no blood.  She denies any chest pain, palpitations, diaphoresis, nausea, vomiting, fevers, chills, hemoptysis, nor any lower extremity pain or swelling.  She has not had any recent travel but did spend [place] at her daughter's house and although they weren't ill at the time, they had some respiratory illness before.  She did not have her influenza vaccination this year.  She doesn't have a diagnosed history of COPD but does say that she stopped smoking one week ago when she started to get more short of breath.    Hospital Course:  The patient was admitted to the hospital for evaluation and treatment of cough and SOB. The patient is a long term smoker. CXR suggested emphysema.  Patient admitted to the hospital for eval and treatment of acute COPD with likely acute bronchitis. Noted troponin bump and patient went for nuclear stress on 1/4/18.  The patient was walked in the valdes without 02 on 1/4 and dropped her 02 sats to 76%.      Interval History: No complaints. States her SOB has improved.     Review of Systems   Constitutional: Negative for activity change.   Respiratory: Positive for cough. Negative for chest tightness, shortness of breath and wheezing.    Cardiovascular: Negative for chest pain.   Gastrointestinal: Negative for abdominal pain.   Genitourinary: Negative for  difficulty urinating.     Objective:     Vital Signs (Most Recent):  Temp: 98.4 °F (36.9 °C) (01/04/18 1205)  Pulse: 78 (01/04/18 1205)  Resp: 20 (01/04/18 1205)  BP: (!) 150/74 (01/04/18 1205)  SpO2: (!) 94 % (01/04/18 1205) Vital Signs (24h Range):  Temp:  [97.4 °F (36.3 °C)-98.9 °F (37.2 °C)] 98.4 °F (36.9 °C)  Pulse:  [68-96] 78  Resp:  [18-20] 20  SpO2:  [94 %-99 %] 94 %  BP: (130-176)/(61-89) 150/74     Weight: 53.1 kg (117 lb 1 oz)  Body mass index is 22.12 kg/m².    Intake/Output Summary (Last 24 hours) at 01/04/18 1207  Last data filed at 01/03/18 1700   Gross per 24 hour   Intake              650 ml   Output                0 ml   Net              650 ml      Physical Exam   Constitutional: She is oriented to person, place, and time. She appears well-developed and well-nourished.   HENT:   Head: Normocephalic and atraumatic.   Cardiovascular: Normal rate.    Pulmonary/Chest: Effort normal.   Neurological: She is alert and oriented to person, place, and time.   Skin: Skin is warm and dry.   Vitals reviewed.      Significant Labs:   BMP:   Recent Labs  Lab 01/03/18  0543   *   *   K 3.5   CL 91*   CO2 33*   BUN 21   CREATININE 0.7   CALCIUM 9.7   MG 2.0     CBC:   Recent Labs  Lab 01/02/18  1318 01/03/18  0543   WBC 11.27 8.20   HGB 17.5* 15.7   HCT 48.1 44.2    221       Significant Imaging:    Assessment/Plan:      * COPD with acute exacerbation    Although she does not have a history of COPD/emphysema I suspect she does have this given her physical exam and history of tobacco abuse- as well as X-ray findings. .  She does have some wheezing but does feel better at this time.  I have reviewed the chest X-ray and it reveals no focal infiltrates or pleural effusions.  She was mildly hypoxic on presentation with an ambient air oxygen saturation of 88%, .  Walked in valdes on 1/4 and found to have an 02 sat of 76 on RA. Will re-start breathing treatments q 4 hours (not sure why stopped).  Will  check tomorrow need for home 02.  Likely has component of acute bronchitis as well without sepsis.  Continue Abx.          Other emphysema    Found on CXR on admission. Discussed with patient          HTN (hypertension), benign    Does not appear patient on any meds.  Will start HCTZ.           Elevated troponin    Stress test on 1/4/18.           Tobacco abuse    Patient was counseled on smoking cessation and she will be provided a nicotine transdermal patch applied while inpatient.  Will provide additional smoking cessation counseling prior to discharge.          VTE Risk Mitigation         Ordered     enoxaparin injection 40 mg  Daily     Route:  Subcutaneous        01/03/18 0212     Medium Risk of VTE  Once      01/03/18 0212        Will re-start q4 hours breathing treatments. Check ambulatory 02 sats in am.  Follow stress test results. Will try to d/c to home with or without 02 tomorrow.         Ruben Raphael MD  Department of Hospital Medicine   [facility]

## 2018-01-04 NOTE — ASSESSMENT & PLAN NOTE
Although she does not have a history of COPD/emphysema I suspect she does have this given her physical exam and history of tobacco abuse- as well as X-ray findings. .  She does have some wheezing but does feel better at this time.  I have reviewed the chest X-ray and it reveals no focal infiltrates or pleural effusions.  She was mildly hypoxic on presentation with an ambient air oxygen saturation of 88%, .  Walked in valdes on 1/4 and found to have an 02 sat of 76 on RA. Will re-start breathing treatments q 4 hours (not sure why stopped).  Will check tomorrow need for home 02.  Likely has component of acute bronchitis as well without sepsis.  Continue Abx.

## 2018-01-04 NOTE — ASSESSMENT & PLAN NOTE
Denies CP. EKG without acute changes. Suspect demand ischemia. Echo with good EF. Stress test today

## 2018-01-04 NOTE — HOSPITAL COURSE
Less SOB  Denies CP    Echo 1/3/18    1 - Normal left ventricular systolic function (EF 55-60%).     2 - Concentric remodeling.     3 - Mild aortic stenosis, JORGE = 1.18 cm2, AVAi = 0.79 cm2/m2, peak velocity = 2.28 m/s, mean gradient = 14 mmHg.

## 2018-01-04 NOTE — PROGRESS NOTES
[facility]  Cardiology  Progress Note    Patient Name: Zayda Owens  MRN: [id number]  Admission Date: 1/2/2018  Hospital Length of Stay: 2 days  Code Status: Full Code   Attending Physician: Ruben Quan MD   Primary Care Physician: Primary Doctor No  Expected Discharge Date:   Principal Problem:COPD with acute exacerbation    Subjective:     Hospital Course:   Less SOB  Denies CP    Echo 1/3/18    1 - Normal left ventricular systolic function (EF 55-60%).     2 - Concentric remodeling.     3 - Mild aortic stenosis, JORGE = 1.18 cm2, AVAi = 0.79 cm2/m2, peak velocity = 2.28 m/s, mean gradient = 14 mmHg.     Interval History:     Review of Systems   Constitution: Negative for decreased appetite.   HENT: Negative for ear discharge.    Eyes: Negative for blurred vision.   Respiratory: Negative for hemoptysis.    Endocrine: Negative for polyphagia.   Hematologic/Lymphatic: Negative for adenopathy.   Skin: Negative for color change.   Musculoskeletal: Negative for joint swelling.   Neurological: Negative for brief paralysis.   Psychiatric/Behavioral: Negative for hallucinations.     Objective:     Vital Signs (Most Recent):  Temp: 97.4 °F (36.3 °C) (01/04/18 0745)  Pulse: 75 (01/04/18 0745)  Resp: 20 (01/04/18 0745)  BP: (!) 176/67 (01/04/18 0745)  SpO2: 96 % (01/04/18 0745) Vital Signs (24h Range):  Temp:  [97.4 °F (36.3 °C)-98.9 °F (37.2 °C)] 97.4 °F (36.3 °C)  Pulse:  [68-96] 75  Resp:  [18-20] 20  SpO2:  [95 %-99 %] 96 %  BP: (130-176)/(61-89) 176/67     Weight: 53.1 kg (117 lb 1 oz)  Body mass index is 22.12 kg/m².     SpO2: 96 %  O2 Device (Oxygen Therapy): nasal cannula      Intake/Output Summary (Last 24 hours) at 01/04/18 0855  Last data filed at 01/03/18 1700   Gross per 24 hour   Intake              650 ml   Output                0 ml   Net              650 ml       Lines/Drains/Airways     Peripheral Intravenous Line                 Peripheral IV - Single Lumen 01/02/18 1853 Left  Antecubital 1 day                Physical Exam   Constitutional: She is oriented to person, place, and time. She appears well-developed and well-nourished.   HENT:   Head: Normocephalic and atraumatic.   Eyes: Conjunctivae are normal. Pupils are equal, round, and reactive to light.   Neck: Normal range of motion. Neck supple.   Cardiovascular: Normal rate, normal heart sounds and intact distal pulses.    Pulmonary/Chest: Effort normal. She has wheezes.   Abdominal: Soft. Bowel sounds are normal.   Musculoskeletal: Normal range of motion.   Neurological: She is alert and oriented to person, place, and time.   Skin: Skin is warm and dry.       Significant Labs: All pertinent lab results from the last 24 hours have been reviewed.    Significant Imaging: Echocardiogram:   2D echo with color flow doppler:   Results for orders placed or performed during the hospital encounter of 01/02/18   2D echo with color flow doppler   Result Value Ref Range    EF 55 55 - 65    Diastolic Dysfunction No     Aortic Valve Stenosis MILD (A)     Est. PA Systolic Pressure 32.16      Assessment and Plan:     Brief HPI:     * COPD with acute exacerbation    Per primary        Elevated troponin    Denies CP. EKG without acute changes. Suspect demand ischemia. Echo with good EF. Stress test today            VTE Risk Mitigation         Ordered     enoxaparin injection 40 mg  Daily     Route:  Subcutaneous        01/03/18 0212     Medium Risk of VTE  Once      01/03/18 0212          Sylvain Monzon MD  Cardiology  [facility]

## 2018-01-04 NOTE — SUBJECTIVE & OBJECTIVE
Interval History: No complaints. States her SOB has improved.     Review of Systems   Constitutional: Negative for activity change.   Respiratory: Positive for cough. Negative for chest tightness, shortness of breath and wheezing.    Cardiovascular: Negative for chest pain.   Gastrointestinal: Negative for abdominal pain.   Genitourinary: Negative for difficulty urinating.     Objective:     Vital Signs (Most Recent):  Temp: 98.4 °F (36.9 °C) (01/04/18 1205)  Pulse: 78 (01/04/18 1205)  Resp: 20 (01/04/18 1205)  BP: (!) 150/74 (01/04/18 1205)  SpO2: (!) 94 % (01/04/18 1205) Vital Signs (24h Range):  Temp:  [97.4 °F (36.3 °C)-98.9 °F (37.2 °C)] 98.4 °F (36.9 °C)  Pulse:  [68-96] 78  Resp:  [18-20] 20  SpO2:  [94 %-99 %] 94 %  BP: (130-176)/(61-89) 150/74     Weight: 53.1 kg (117 lb 1 oz)  Body mass index is 22.12 kg/m².    Intake/Output Summary (Last 24 hours) at 01/04/18 1207  Last data filed at 01/03/18 1700   Gross per 24 hour   Intake              650 ml   Output                0 ml   Net              650 ml      Physical Exam   Constitutional: She is oriented to person, place, and time. She appears well-developed and well-nourished.   HENT:   Head: Normocephalic and atraumatic.   Cardiovascular: Normal rate.    Pulmonary/Chest: Effort normal.   Neurological: She is alert and oriented to person, place, and time.   Skin: Skin is warm and dry.   Vitals reviewed.      Significant Labs:   BMP:   Recent Labs  Lab 01/03/18  0543   *   *   K 3.5   CL 91*   CO2 33*   BUN 21   CREATININE 0.7   CALCIUM 9.7   MG 2.0     CBC:   Recent Labs  Lab 01/02/18  1318 01/03/18  0543   WBC 11.27 8.20   HGB 17.5* 15.7   HCT 48.1 44.2    221       Significant Imaging:

## 2018-01-04 NOTE — PLAN OF CARE
Problem: Fall Risk (Adult)  Goal: Absence of Falls  Patient will demonstrate the desired outcomes by discharge/transition of care.   Outcome: Ongoing (interventions implemented as appropriate)   01/04/18 0430   Fall Risk (Adult)   Absence of Falls making progress toward outcome

## 2018-01-04 NOTE — HOSPITAL COURSE
The patient was admitted to the hospital for evaluation and treatment of cough and SOB. The patient is a long term smoker. CXR suggested emphysema.  Patient admitted to the hospital for eval and treatment of acute COPD with likely acute bronchitis. Noted troponin bump and patient went for nuclear stress on 1/4/18.  The patient was walked in the valdes without 02 on 1/4 and dropped her 02 sats to 76%.  The patient's stress test was negative for ischemia but did have a fixed defect.  The patient was walked again in the halls after 24 hours of around the clock breathing treatments. Her sats minimal were 90%.  The patient will be discharged to home today. Strongly discouraged further Tob. Use. Activity as tolerated. Low Na  diet. Follow up with PCP and Dr. Monzon in one week.     She will be discharged home on Norvasc 10 for a new diagnosis of hypertension, as well as a prednisone taper and Levaquin for 5 days and ASA 81 daily.     I will personally call the patient on 1/6 to check on.

## 2018-01-05 VITALS
WEIGHT: 115.94 LBS | BODY MASS INDEX: 21.89 KG/M2 | HEIGHT: 61 IN | OXYGEN SATURATION: 98 % | DIASTOLIC BLOOD PRESSURE: 80 MMHG | RESPIRATION RATE: 16 BRPM | HEART RATE: 71 BPM | SYSTOLIC BLOOD PRESSURE: 153 MMHG | TEMPERATURE: 98 F

## 2018-01-05 PROBLEM — J44.1 COPD WITH ACUTE EXACERBATION: Status: RESOLVED | Noted: 2018-01-02 | Resolved: 2018-01-05

## 2018-01-05 PROBLEM — R79.89 ELEVATED TROPONIN: Status: RESOLVED | Noted: 2018-01-03 | Resolved: 2018-01-05

## 2018-01-05 PROCEDURE — 25000003 PHARM REV CODE 250: Performed by: INTERNAL MEDICINE

## 2018-01-05 PROCEDURE — 25000003 PHARM REV CODE 250: Performed by: EMERGENCY MEDICINE

## 2018-01-05 PROCEDURE — 63600175 PHARM REV CODE 636 W HCPCS: Performed by: INTERNAL MEDICINE

## 2018-01-05 RX ORDER — LEVOFLOXACIN 750 MG/1
750 TABLET ORAL DAILY
Qty: 5 TABLET | Refills: 0 | Status: SHIPPED | OUTPATIENT
Start: 2018-01-05 | End: 2018-01-10 | Stop reason: ALTCHOICE

## 2018-01-05 RX ORDER — PREDNISONE 20 MG/1
TABLET ORAL
Qty: 18 TABLET | Refills: 0 | Status: SHIPPED | OUTPATIENT
Start: 2018-01-05 | End: 2018-01-10

## 2018-01-05 RX ORDER — NAPROXEN SODIUM 220 MG/1
81 TABLET, FILM COATED ORAL DAILY
Qty: 30 TABLET | Refills: 0 | COMMUNITY
Start: 2018-01-05 | End: 2019-01-05

## 2018-01-05 RX ORDER — AMLODIPINE BESYLATE 10 MG/1
10 TABLET ORAL DAILY
Qty: 30 TABLET | Refills: 5 | Status: SHIPPED | OUTPATIENT
Start: 2018-01-05 | End: 2019-01-05

## 2018-01-05 RX ADMIN — METHYLPREDNISOLONE SODIUM SUCCINATE 125 MG: 125 INJECTION, POWDER, FOR SOLUTION INTRAMUSCULAR; INTRAVENOUS at 01:01

## 2018-01-05 RX ADMIN — METHYLPREDNISOLONE SODIUM SUCCINATE 125 MG: 125 INJECTION, POWDER, FOR SOLUTION INTRAMUSCULAR; INTRAVENOUS at 05:01

## 2018-01-05 RX ADMIN — PANTOPRAZOLE SODIUM 40 MG: 40 TABLET, DELAYED RELEASE ORAL at 08:01

## 2018-01-05 RX ADMIN — MOXIFLOXACIN HYDROCHLORIDE 400 MG: 400 TABLET, FILM COATED ORAL at 08:01

## 2018-01-05 RX ADMIN — HYDROCHLOROTHIAZIDE 25 MG: 25 TABLET ORAL at 08:01

## 2018-01-05 RX ADMIN — ASPIRIN 325 MG ORAL TABLET 325 MG: 325 PILL ORAL at 08:01

## 2018-01-05 NOTE — PLAN OF CARE
Problem: Patient Care Overview  Goal: Plan of Care Review   01/05/18 0627   Coping/Psychosocial   Plan Of Care Reviewed With patient

## 2018-01-05 NOTE — PROGRESS NOTES
Follow-up Information     Sylvain Monzon MD On 1/12/2018.    Specialty:  Cardiology  Why:  Appointment scheduled for Friday January 12th at 10:30am.  Contact information:  [place] [id number]  [phone]                 Thank you for choosing [facility] for your care. Within 48-72 hours after leaving the hospital you will receive a call from [facility] Nurses following up to see how you are doing. The team will ask you a few questions and the call will last approximately 20 minutes.     Please answer any calls you may receive from [facility] we want to continue to support you as you manage your healthcare needs. [facility] is happy to have the opportunity to serve you.     [facility] On Call Nurse Care Line - 24/7 Assistance  Registered [facility] nurses can provide appointment booking, health education, clinical advisement, and other advisory services.   Call for this free service at [phone].              Sincerely,   Your [facility] Team,   Simran Bolton RN/[place]  [phone]

## 2018-01-05 NOTE — ASSESSMENT & PLAN NOTE
Although she does not have a history of COPD/emphysema I suspect she does have this given her physical exam and history of tobacco abuse- as well as X-ray findings. .  She does have some wheezing but does feel better at this time.  I have reviewed the chest X-ray and it reveals no focal infiltrates or pleural effusions.  She was mildly hypoxic on presentation with an ambient air oxygen saturation of 88%, .  Walked in valdes on 1/4 and found to have an 02 sat of 76 on RA. Will re-start breathing treatments q 4 hours (not sure why stopped).  Will check tomorrow need for home 02.  Likely has component of acute bronchitis as well without sepsis.  Continue Abx. - today- 90% on RA with exercise. Will d/c to home.  Patient wants to go home.

## 2018-01-05 NOTE — PLAN OF CARE
Problem: ARDS (Acute Resp Distress Syndrome) (Adult)  Goal: Signs and Symptoms of Listed Potential Problems Will be Absent, Minimized or Managed (ARDS)  Signs and symptoms of listed potential problems will be absent, minimized or managed by discharge/transition of care (reference ARDS (Acute Resp Distress Syndrome) (Adult) CPG).    01/03/18 1350 01/05/18 0628   ARDS (Acute Resp Distress Syndrome)   Problems Assessed (Acute Respiratory Distress Syndrome) hypoxia/hypoxemia --    Problems Present (Acute Respiratory Distress Syndrome) --  none

## 2018-01-05 NOTE — PROGRESS NOTES
[facility] Hospital Medicine  Progress Note    Patient Name: Zayda Owens  MRN: [id number]  Patient Class: IP- Inpatient   Admission Date: 1/2/2018  Length of Stay: 3 days  Attending Physician: Ruben Quan MD  Primary Care Provider: Primary Doctor No        Subjective:     Principal Problem:COPD with acute exacerbation    HPI:  Mrs. Zayda Owens is a 72 y.o. female with tobacco abuse who presents to [facility] ED with complaints of dyspnea for one week.  The dyspnea is mainly on exertion and is associated with coughing.  The cough was productive initially of yellow sputum but that has changed to green; there is no blood.  She denies any chest pain, palpitations, diaphoresis, nausea, vomiting, fevers, chills, hemoptysis, nor any lower extremity pain or swelling.  She has not had any recent travel but did spend [place] at her daughter's house and although they weren't ill at the time, they had some respiratory illness before.  She did not have her influenza vaccination this year.  She doesn't have a diagnosed history of COPD but does say that she stopped smoking one week ago when she started to get more short of breath.    Hospital Course:  The patient was admitted to the hospital for evaluation and treatment of cough and SOB. The patient is a long term smoker. CXR suggested emphysema.  Patient admitted to the hospital for eval and treatment of acute COPD with likely acute bronchitis. Noted troponin bump and patient went for nuclear stress on 1/4/18.  The patient was walked in the valdes without 02 on 1/4 and dropped her 02 sats to 76%.  The patient's stress test was negative for ischemia but did have a fixed defect.  The patient was walked again in the halls after 24 hours of around the clock breathing treatments. Her sats minimal were 90%.  The patient will be discharged to home today. Strongly discouraged further Tob. Use. Activity as tolerated. Regular diet. Follow up with PCP and   Nicolás in one week.     Interval History: No new issues. Would like to go home.  95% on RA at rest. 90% with walking.     Review of Systems   Constitutional: Negative for activity change.   Respiratory: Positive for cough. Negative for chest tightness, shortness of breath and wheezing.    Cardiovascular: Negative for chest pain.   Gastrointestinal: Negative for abdominal pain.   Genitourinary: Negative for difficulty urinating.     Objective:     Vital Signs (Most Recent):  Temp: 98.4 °F (36.9 °C) (01/05/18 1223)  Pulse: 71 (01/05/18 1223)  Resp: 16 (01/05/18 1223)  BP: (!) 153/80 (01/05/18 1223)  SpO2: 98 % (01/05/18 1223) Vital Signs (24h Range):  Temp:  [97.3 °F (36.3 °C)-98.5 °F (36.9 °C)] 98.4 °F (36.9 °C)  Pulse:  [64-71] 71  Resp:  [16-20] 16  SpO2:  [93 %-99 %] 98 %  BP: (141-166)/(58-83) 153/80     Weight: 52.6 kg (115 lb 15.4 oz)  Body mass index is 21.91 kg/m².  No intake or output data in the 24 hours ending 01/05/18 1228   Physical Exam   Constitutional: She is oriented to person, place, and time. She appears well-developed and well-nourished.   HENT:   Head: Normocephalic and atraumatic.   Neurological: She is alert and oriented to person, place, and time.   Skin: Skin is warm and dry.   Psychiatric: She has a normal mood and affect. Her behavior is normal.   Vitals reviewed.      Significant Labs: BMP: No results for input(s): GLU, NA, K, CL, CO2, BUN, CREATININE, CALCIUM, MG in the last 48 hours.  CBC: No results for input(s): WBC, HGB, HCT, PLT in the last 48 hours.    Significant Imaging:    Assessment/Plan:      * COPD with acute exacerbation    Although she does not have a history of COPD/emphysema I suspect she does have this given her physical exam and history of tobacco abuse- as well as X-ray findings. .  She does have some wheezing but does feel better at this time.  I have reviewed the chest X-ray and it reveals no focal infiltrates or pleural effusions.  She was mildly hypoxic on  presentation with an ambient air oxygen saturation of 88%, .  Walked in valdes on 1/4 and found to have an 02 sat of 76 on RA. Will re-start breathing treatments q 4 hours (not sure why stopped).  Will check tomorrow need for home 02.  Likely has component of acute bronchitis as well without sepsis.  Continue Abx. - today- 90% on RA with exercise. Will d/c to home.  Patient wants to go home.         Other emphysema    Found on CXR on admission. Discussed with patient          HTN (hypertension), benign    Does not appear patient on any meds.  Will start HCTZ.           Elevated troponin    Stress test on 1/4/18.   Negative for ischemia.  + fixed defect. Will have cards follow up.         Tobacco abuse    Patient was counseled on smoking cessation and she will be provided a nicotine transdermal patch applied while inpatient.  Will provide additional smoking cessation counseling prior to discharge.          VTE Risk Mitigation         Ordered     enoxaparin injection 40 mg  Daily     Route:  Subcutaneous        01/03/18 0212     Medium Risk of VTE  Once      01/03/18 0212          Will d/c to home.     Ruben Raphael MD  Department of Hospital Medicine   [facility]

## 2018-01-05 NOTE — DISCHARGE SUMMARY
[facility] Hospital Medicine  Discharge Summary      Patient Name: Zayda Owens  MRN: [id number]  Admission Date: 1/2/2018  Hospital Length of Stay: 3 days  Discharge Date and Time:  01/05/2018 12:35 PM  Attending Physician: Ruben Quan MD   Discharging Provider: Ruben Quan MD  Primary Care Provider: Primary Doctor No      HPI:   Mrs. Zayda Owens is a 72 y.o. female with tobacco abuse who presents to [facility] ED with complaints of dyspnea for one week.  The dyspnea is mainly on exertion and is associated with coughing.  The cough was productive initially of yellow sputum but that has changed to green; there is no blood.  She denies any chest pain, palpitations, diaphoresis, nausea, vomiting, fevers, chills, hemoptysis, nor any lower extremity pain or swelling.  She has not had any recent travel but did spend [place] at her daughter's house and although they weren't ill at the time, they had some respiratory illness before.  She did not have her influenza vaccination this year.  She doesn't have a diagnosed history of COPD but does say that she stopped smoking one week ago when she started to get more short of breath.    * No surgery found *      Hospital Course:   The patient was admitted to the hospital for evaluation and treatment of cough and SOB. The patient is a long term smoker. CXR suggested emphysema.  Patient admitted to the hospital for eval and treatment of acute COPD with likely acute bronchitis. Noted troponin bump and patient went for nuclear stress on 1/4/18.  The patient was walked in the valdes without 02 on 1/4 and dropped her 02 sats to 76%.  The patient's stress test was negative for ischemia but did have a fixed defect.  The patient was walked again in the halls after 24 hours of around the clock breathing treatments. Her sats minimal were 90%.  The patient will be discharged to home today. Strongly discouraged further Tob. Use. Activity as tolerated.  Low Na  diet. Follow up with PCP and Dr. Monzon in one week.     She will be discharged home on Norvasc 10 for a new diagnosis of hypertension, as well as a prednisone taper and Levaquin for 5 days and ASA 81 daily.     I will personally call the patient on 1/6 to check on.     PCP can consider pulmonary follow up.       Consults:   Consults         Status Ordering Provider     Inpatient consult to Cardiology  Once     Provider:  Sylvain Monzon MD    Acknowledged MARIAM MOHAN     IP consult to case management  Once     Provider:  (Not yet assigned)    Acknowledged MARIAM MOHAN          No new Assessment & Plan notes have been filed under this hospital service since the last note was generated.  Service: Hospital Medicine    Final Active Diagnoses:    Diagnosis Date Noted POA    HTN (hypertension), benign [I10] 01/04/2018 Yes    Other emphysema [J43.8] 01/04/2018 Yes    Tobacco abuse [Z72.0] 01/03/2018 Yes     Chronic      Problems Resolved During this Admission:    Diagnosis Date Noted Date Resolved POA    PRINCIPAL PROBLEM:  COPD with acute exacerbation [J44.1] 01/02/2018 01/05/2018 Yes    Elevated troponin [R74.8] 01/03/2018 01/05/2018 Yes    Dyspnea [R06.00] 01/02/2018 01/03/2018 Yes       Discharged Condition: good    Disposition: Home or Self Care    Follow Up:  Follow-up Information     Primary Doctor No In 1 week.           Sylvain Monzon MD In 1 week.    Specialty:  Cardiology  Contact information:  [address] [facility]  [place] [address]  [phone]                 Patient Instructions:     Diet Low Sodium, 2gm     Activity as tolerated         Significant Diagnostic Studies:     Pending Diagnostic Studies:     Procedure Component Value Units Date/Time    NM Myocardial Perfusion Spect Multi Pharmacologic [[id number]] Resulted:  01/04/18 0812    Order Status:  Sent Lab Status:  In process Updated:  01/04/18 1048         Medications:  Reconciled Home Medications:   Current Discharge  Medication List      START taking these medications    Details   amLODIPine (NORVASC) 10 MG tablet Take 1 tablet (10 mg total) by mouth once daily.  Qty: 30 tablet, Refills: 5      aspirin 81 MG Chew Take 1 tablet (81 mg total) by mouth once daily.  Qty: 30 tablet, Refills: 0      levoFLOXacin (LEVAQUIN) 750 MG tablet Take 1 tablet (750 mg total) by mouth once daily.  Qty: 5 tablet, Refills: 0      predniSONE (DELTASONE) 20 MG tablet 3 tablets po daily for 3 days  2 tablets po daily for 3 days  1 tablet po daily for 3 days  Qty: 18 tablet, Refills: 0         CONTINUE these medications which have NOT CHANGED    Details   ibuprofen (ADVIL,MOTRIN) 200 MG tablet Take 200 mg by mouth every 6 (six) hours as needed for Pain.             Indwelling Lines/Drains at time of discharge:   Lines/Drains/Airways          No matching active lines, drains, or airways          Time spent on the discharge of patient: < 30 minutes  Patient was seen and examined on the date of discharge and determined to be suitable for discharge.         Ruben Raphael MD  Department of Hospital Medicine  [facility]

## 2018-01-05 NOTE — SUBJECTIVE & OBJECTIVE
Interval History: No new issues. Would like to go home.  95% on RA at rest. 90% with walking.     Review of Systems   Constitutional: Negative for activity change.   Respiratory: Positive for cough. Negative for chest tightness, shortness of breath and wheezing.    Cardiovascular: Negative for chest pain.   Gastrointestinal: Negative for abdominal pain.   Genitourinary: Negative for difficulty urinating.     Objective:     Vital Signs (Most Recent):  Temp: 98.4 °F (36.9 °C) (01/05/18 1223)  Pulse: 71 (01/05/18 1223)  Resp: 16 (01/05/18 1223)  BP: (!) 153/80 (01/05/18 1223)  SpO2: 98 % (01/05/18 1223) Vital Signs (24h Range):  Temp:  [97.3 °F (36.3 °C)-98.5 °F (36.9 °C)] 98.4 °F (36.9 °C)  Pulse:  [64-71] 71  Resp:  [16-20] 16  SpO2:  [93 %-99 %] 98 %  BP: (141-166)/(58-83) 153/80     Weight: 52.6 kg (115 lb 15.4 oz)  Body mass index is 21.91 kg/m².  No intake or output data in the 24 hours ending 01/05/18 1228   Physical Exam   Constitutional: She is oriented to person, place, and time. She appears well-developed and well-nourished.   HENT:   Head: Normocephalic and atraumatic.   Neurological: She is alert and oriented to person, place, and time.   Skin: Skin is warm and dry.   Psychiatric: She has a normal mood and affect. Her behavior is normal.   Vitals reviewed.      Significant Labs: BMP: No results for input(s): GLU, NA, K, CL, CO2, BUN, CREATININE, CALCIUM, MG in the last 48 hours.  CBC: No results for input(s): WBC, HGB, HCT, PLT in the last 48 hours.    Significant Imaging:

## 2018-01-05 NOTE — PLAN OF CARE
01/05/18 1249   Final Note   Assessment Type Final Discharge Note   Discharge Disposition Home   Hospital Follow Up  Appt(s) scheduled? Yes   Discharge plans and expectations educations in teach back method with documentation complete? Yes   Right Care Referral Info   Post Acute Recommendation No Care     Follow-up Information     Sylvain Monzon MD On 1/12/2018.    Specialty:  Cardiology  Why:  Appointment scheduled for Friday January 12th at 10:30am.  Contact information:  [address] [facility]  [place] [address] [phone]

## 2018-01-05 NOTE — PLAN OF CARE
Problem: Fall Risk (Adult)  Intervention: Reduce Risk/Promote Restraint Free Environment   18 104   Safety Interventions   Environmental Safety Modification assistive device/personal items within reach;clutter free environment maintained;room near unit station   Prevent  Drop/Fall   Safety/Security Measures bed alarm set;family to remain at bedside     Intervention: Review Medications/Identify Contributors to Fall Risk   18   Safety Interventions   Medication Review/Management medications reviewed     Intervention: Patient Rounds   18 104   Safety Interventions   Patient Rounds bed in low position;bed wheels locked;call light in reach;ID band on;visualized patient;toileting offered       Goal: Identify Related Risk Factors and Signs and Symptoms  Related risk factors and signs and symptoms are identified upon initiation of Human Response Clinical Practice Guideline (CPG)    18 1045   Fall Risk   Related Risk Factors (Fall Risk) polypharmacy;inadequate lighting

## 2018-01-05 NOTE — PLAN OF CARE
Problem: Fall Risk (Adult)  Goal: Absence of Falls  Patient will demonstrate the desired outcomes by discharge/transition of care.    01/05/18 0626   Fall Risk (Adult)   Absence of Falls achieves outcome

## 2018-01-07 LAB
BACTERIA BLD CULT: NORMAL
BACTERIA BLD CULT: NORMAL

## 2018-01-09 ENCOUNTER — PATIENT OUTREACH (OUTPATIENT)
Dept: ADMINISTRATIVE | Facility: CLINIC | Age: 73
End: 2018-01-09

## 2018-01-09 NOTE — Clinical Note
Please forward this important TCC information to your provider in order to maximize the post discharge care delivery of this patient.  C3 nurse spoke with Zayda Owens  for a TCC post hospital discharge follow up call. The patient has a scheduled HOSFU appointment with Enedina Dickens MD on 1/10 @ 0171.  Respectfully, Anne Herrera RN  [facility]   [email or web address]     Please do not reply to this message, as this inbox is not routinely monitored.

## 2018-01-09 NOTE — PATIENT INSTRUCTIONS
COPD Flare  Both emphysema and chronic bronchitis are forms of Chronic Obstructive Pulmonary Disease (COPD). It is most often caused by many years of smoking tobacco. Many things can make your lung disease suddenly get worse. These causes include the common cold, pneumonia, acute bronchitis, missing doses of your regular breathing medicines, or exposure to smoke, dust, or other air pollutants.  A COPD flare may last 7-14 days. Medicine may be prescribed to relax the airways and prevent wheezing. Antibiotics will be prescribed if your doctor thinks there is a bacterial infection. Prednisone is helpful to decrease inflammation in a severe attack.  Home Care:  Drink lots of water or other fluids (at least 10 glasses a day) during an attack. This will loosen lung secretions and make it easier to breathe. If you have heart or kidney disease, check with your doctor before you drink extra amounts of fluids.  Take prescribed medicine exactly at the times advised. If you have a hand-held inhaler or aerosol breathing medicine, do not use it more than once every four hours, unless told to do so. If prescribed an antibiotic or prednisone, take all of the medicine even if you are feeling better after a few days.  Do not smoke. Avoid being exposed to the smoke of others.  If you were given an inhaler, use it exactly as directed. If you need to use it more often than prescribed, your condition may be getting worse. Contact your doctor or this facility.  Follow Up  with your doctor, or as advised by our staff.  NOTE: If you are age 65 or older, or if you have chronic asthma or COPD, we  recommend a PNEUMOCOCCAL VACCINATION every five years and an INFLUENZA VACCINATION (FLU-SHOT) every autumn. Ask your doctor about this.  Get Prompt Medical Attention  if any of the following occur:  Increased wheezing or shortness of breath  Need to use your inhalers more often than usual without relief  Fever of 100.4°F (38ºC) or higher, or as  directed by your healthcare provider  Coughing up lots of dark-colored or bloody sputum (mucus)  Chest pain with each breath  You do not start to improve within 24 hours  © 4561-5761 [employer], [address] [place] [address], [place] [address]. All rights reserved. This information is not intended as a substitute for professional medical care. Always follow your healthcare professional's instructions.

## 2018-01-09 NOTE — PROGRESS NOTES
C3 nurse attempted to contact patient. No answer.  C3 nurse attempted to contact Zayda Owens  for a TCC post hospital discharge follow up call. No answer at phone number listed and no voicemail available. The patient does not have a scheduled HOSFU appointment within 7-14 days post hospital discharge date 1/5. Message sent to Physician staff to assist with HOSFU appointment scheduling.  NO PCP LISTED.

## 2018-01-09 NOTE — PROGRESS NOTES
Call placed to pts daughter and pt to inquire on follow up appointment information.  Spoke to pts daughter who states that the pt has informed her that she will not be going to the appointment scheduled with the cardiologist.  TN informed her that the appointment scheduled with cardiology was per MD request and that it is important to follow up. TN scheduled PCP follow up appointment for pt and entered information into follow up tab but again stressed that the pt should keep all scheduled appointments post hospital discharge

## 2018-01-10 ENCOUNTER — OFFICE VISIT (OUTPATIENT)
Dept: FAMILY MEDICINE | Facility: CLINIC | Age: 73
End: 2018-01-10
Payer: MEDICARE

## 2018-01-10 ENCOUNTER — TELEPHONE (OUTPATIENT)
Dept: FAMILY MEDICINE | Facility: CLINIC | Age: 73
End: 2018-01-10

## 2018-01-10 VITALS
HEART RATE: 83 BPM | HEIGHT: 61 IN | TEMPERATURE: 99 F | SYSTOLIC BLOOD PRESSURE: 100 MMHG | DIASTOLIC BLOOD PRESSURE: 64 MMHG | OXYGEN SATURATION: 96 %

## 2018-01-10 DIAGNOSIS — J44.1 COPD WITH ACUTE EXACERBATION: Primary | ICD-10-CM

## 2018-01-10 DIAGNOSIS — I10 HTN (HYPERTENSION), BENIGN: ICD-10-CM

## 2018-01-10 DIAGNOSIS — Z23 NEED FOR VACCINATION WITH 13-POLYVALENT PNEUMOCOCCAL CONJUGATE VACCINE: ICD-10-CM

## 2018-01-10 DIAGNOSIS — K12.0 ORAL APHTHOUS ULCER: ICD-10-CM

## 2018-01-10 DIAGNOSIS — Z23 NEEDS FLU SHOT: ICD-10-CM

## 2018-01-10 PROCEDURE — 99999 PR PBB SHADOW E&M-EST. PATIENT-LVL III: CPT | Mod: PBBFAC,,, | Performed by: INTERNAL MEDICINE

## 2018-01-10 PROCEDURE — 99213 OFFICE O/P EST LOW 20 MIN: CPT | Mod: PBBFAC,PO,25 | Performed by: INTERNAL MEDICINE

## 2018-01-10 PROCEDURE — 99205 OFFICE O/P NEW HI 60 MIN: CPT | Mod: S$PBB,,, | Performed by: INTERNAL MEDICINE

## 2018-01-10 PROCEDURE — 90662 IIV NO PRSV INCREASED AG IM: CPT | Mod: PBBFAC,PO

## 2018-01-10 PROCEDURE — G0009 ADMIN PNEUMOCOCCAL VACCINE: HCPCS | Mod: PBBFAC,PO

## 2018-01-10 RX ORDER — FLUTICASONE PROPIONATE AND SALMETEROL 100; 50 UG/1; UG/1
1 POWDER RESPIRATORY (INHALATION) 2 TIMES DAILY
Qty: 60 EACH | Refills: 3 | Status: SHIPPED | OUTPATIENT
Start: 2018-01-10 | End: 2019-01-10

## 2018-01-10 NOTE — TELEPHONE ENCOUNTER
Returned pt's 's phone call and he reports the medication cost $400. He was advised to have the pharmacy send me a PA or have the insurance send me what is on the formulary and I'll complete the necessary forms. He voiced understanding.

## 2018-01-10 NOTE — TELEPHONE ENCOUNTER
----- Message from Aurora Mcdonnell sent at 1/10/2018 11:46 AM CST -----  Contact: spouse  Pt spouse calling to state that [employer] will not cover medication of fluticasone-salmeterol 100-50 mcg/dose (ADVAIR). Pt is unsure if medication needs a p/a or another inhaler. Please call [phone].

## 2018-01-10 NOTE — PROGRESS NOTES
"SUBJECTIVE     Chief Complaint   Patient presents with    [facility]    Hospital Follow Up       HPI  Zayda Owens is a 72 y.o. female with multiple medical diagnoses as listed in the medical history and problem list that presents for follow-up after recent hospitalization for COPD exacerbation. Pt reports feeling better since discharge. She has completed her course of Abx and steroids with resolution of SOB. Pt does still report having a productive cough. She complains of sores on the inside of her mouth that started after taking steroids, so the hospitalist told her to discard of them. Denies any fever, chills, or night sweats.     PAST MEDICAL HISTORY:  Past Medical History:   Diagnosis Date    IBS (irritable bowel syndrome)        PAST SURGICAL HISTORY:  Past Surgical History:   Procedure Laterality Date    CHOLECYSTECTOMY  1990       SOCIAL HISTORY:  Social History     Social History    Marital status:      Spouse name: N/A    Number of children: N/A    Years of education: N/A     Occupational History    Not on file.     Social History Main Topics    Smoking status: Former Smoker     Types: Cigarettes     Quit date: 12/28/2017    Smokeless tobacco: Not on file      Comment: Pt states "i quit smoking 5 days ago"    Alcohol use Not on file    Drug use: Unknown    Sexual activity: Not on file     Other Topics Concern    Not on file     Social History Narrative    No narrative on file       FAMILY HISTORY:  Family History   Problem Relation Age of Onset    Alcohol abuse Mother     Diabetes type II Father        ALLERGIES AND MEDICATIONS: updated and reviewed.  Review of patient's allergies indicates:  No Known Allergies  Current Outpatient Prescriptions   Medication Sig Dispense Refill    amLODIPine (NORVASC) 10 MG tablet Take 1 tablet (10 mg total) by mouth once daily. 30 tablet 5    aspirin 81 MG Chew Take 1 tablet (81 mg total) by mouth once daily. 30 tablet 0    ibuprofen " "(ADVIL,MOTRIN) 200 MG tablet Take 200 mg by mouth every 6 (six) hours as needed for Pain.      fluticasone-salmeterol 100-50 mcg/dose (ADVAIR) 100-50 mcg/dose diskus inhaler Inhale 1 puff into the lungs 2 (two) times daily. Controller 60 each 3     No current facility-administered medications for this visit.        ROS  Review of Systems   Constitutional: Negative for chills and fever.   HENT: Negative for hearing loss and sore throat.    Eyes: Negative for visual disturbance.   Respiratory: Positive for cough and shortness of breath.    Cardiovascular: Negative for chest pain, palpitations and leg swelling.   Gastrointestinal: Negative for abdominal pain, constipation, diarrhea, nausea and vomiting.   Genitourinary: Negative for dysuria, frequency and urgency.   Musculoskeletal: Negative for arthralgias, joint swelling and myalgias.   Skin: Positive for wound (oral sores). Negative for rash.   Neurological: Negative for headaches.   Psychiatric/Behavioral: Negative for agitation and confusion. The patient is not nervous/anxious.          OBJECTIVE     Physical Exam  Vitals:    01/10/18 0940   BP: 100/64   Pulse: 83   Temp: 98.6 °F (37 °C)    There is no height or weight on file to calculate BMI.      Height: 5' 1" (154.9 cm)     Physical Exam   Constitutional: She is oriented to person, place, and time. She appears well-developed and well-nourished. No distress.   HENT:   Head: Normocephalic and atraumatic.   Right Ear: External ear normal.   Left Ear: External ear normal.   Nose: Nose normal.   Mouth/Throat: Oropharynx is clear and moist.   Ulcers to oral mucosa of upper/lower lips   Eyes: Conjunctivae and EOM are normal. Right eye exhibits no discharge. Left eye exhibits no discharge. No scleral icterus.   Neck: Normal range of motion. Neck supple. No JVD present. No tracheal deviation present.   Cardiovascular: Normal rate, regular rhythm and intact distal pulses.  Exam reveals no gallop and no friction rub.  "   No murmur heard.  Pulmonary/Chest: Effort normal and breath sounds normal. No respiratory distress. She has no wheezes.   Abdominal: Soft. Bowel sounds are normal. She exhibits no distension and no mass. There is no tenderness. There is no rebound and no guarding.   Musculoskeletal: Normal range of motion. She exhibits no edema, tenderness or deformity.   Neurological: She is alert and oriented to person, place, and time. She exhibits normal muscle tone. Coordination normal.   Skin: Skin is warm and dry. No rash noted. No erythema.   Psychiatric: She has a normal mood and affect. Her behavior is normal. Judgment and thought content normal.         Health Maintenance       Date Due Completion Date    Hepatitis C Screening 1945 ---    Lipid Panel 1945 ---    TETANUS VACCINE 06/01/1963 ---    Mammogram 06/01/1985 ---    DEXA SCAN 06/01/1985 ---    Colonoscopy 06/01/1995 ---    Zoster Vaccine 06/01/2005 ---    Pneumococcal (65+) (1 of 2 - PCV13) 06/01/2010 ---    Influenza Vaccine 08/01/2017 ---            ASSESSMENT     72 y.o. female with     1. COPD with acute exacerbation    2. HTN (hypertension), benign    3. Oral aphthous ulcer    4. Need for vaccination with 13-polyvalent pneumococcal conjugate vaccine    5. Needs flu shot        PLAN:     1. COPD with acute exacerbation  - Pt with likely emphysema given recent presentation with SOB and CXR findings(independently reviewed by myself along with labs); also reviewed cardiac studies given elevated troponin  - Pt has completed course of Abx along with some of the steroids with improvement, but has persistent productive cough  - Pt advised to refrain from smoking and pt assures me she has quit smoking  - Will obtain formal PFTs for further eval and start LABA/steroid inhaler  - Complete PFT with bronchodilator; Future  - fluticasone-salmeterol 100-50 mcg/dose (ADVAIR) 100-50 mcg/dose diskus inhaler; Inhale 1 puff into the lungs 2 (two) times daily.  Controller  Dispense: 60 each; Refill: 3    2. HTN (hypertension), benign  - BP well controlled; at goal of <140/90  - The current medical regimen is effective;  continue present plan and medications.    3. Oral aphthous ulcer  - Thought to be 2/2 steroids but unknown etiology  - Monitor    4. Need for vaccination with 13-polyvalent pneumococcal conjugate vaccine  - (In Office Administered) Pneumococcal Conjugate Vaccine (13 Valent) (IM)    5. Needs flu shot  - Influenza - High Dose (65+) (PF) (IM)        RTC in 4 weeks for repeat assessment of current treatment plan       Transitional Care Note    Family and/or Caretaker present at visit?  Yes.  Diagnostic tests reviewed/disposition: No diagnosic tests pending after this hospitalization.  Disease/illness education: Completed at today's visit  Home health/community services discussion/referrals: Patient does not have home health established from hospital visit.  They do not need home health.  If needed, we will set up home health for the patient.   Establishment or re-establishment of referral orders for community resources: No other necessary community resources.   Discussion with other health care providers: No discussion with other health care providers necessary        Enedina Dickens MD  01/10/2018 9:53 AM        No Follow-up on file.

## 2018-01-22 ENCOUNTER — TELEPHONE (OUTPATIENT)
Dept: FAMILY MEDICINE | Facility: CLINIC | Age: 73
End: 2018-01-22

## 2018-01-22 NOTE — TELEPHONE ENCOUNTER
Called pt and informed her that her BLE foot swelling is likely 2/2 an adverse side effect of newly started Norvasc. Pt advised to cut tablet in half so as to only take Norvasc 5 mg po q24. She is to monitor BP and BLE foot swelling on lowered dosage. If persists, she will start another BP medication. She voiced understanding.

## 2018-01-22 NOTE — TELEPHONE ENCOUNTER
----- Message from Vj Bryant sent at 1/22/2018  8:59 AM CST -----  Contact: [phone]/PT  Calling to speak with doc or nurse to confirm medication

## 2018-01-22 NOTE — TELEPHONE ENCOUNTER
Spoke with patient/ states that she has been experiencing bilateral foot swelling since last week and wants to know what she should do/ please advise

## 2018-06-15 DIAGNOSIS — Z12.39 BREAST CANCER SCREENING: ICD-10-CM

## 2018-08-20 DIAGNOSIS — Z12.11 COLON CANCER SCREENING: ICD-10-CM

## 2019-08-16 DIAGNOSIS — Z12.39 BREAST CANCER SCREENING: ICD-10-CM

## 2019-08-23 DIAGNOSIS — Z12.11 COLON CANCER SCREENING: ICD-10-CM

## 2019-09-05 ENCOUNTER — PATIENT OUTREACH (OUTPATIENT)
Dept: ADMINISTRATIVE | Facility: HOSPITAL | Age: 74
End: 2019-09-05

## 2019-10-30 ENCOUNTER — TELEPHONE (OUTPATIENT)
Dept: FAMILY MEDICINE | Facility: CLINIC | Age: 74
End: 2019-10-30

## 2019-10-30 NOTE — TELEPHONE ENCOUNTER
Called patient to schedule her annual and labs, spoke with patient's  and he stated she has cancer and wouldn't be making any other appointments.